# Patient Record
Sex: MALE | Race: WHITE | NOT HISPANIC OR LATINO | Employment: OTHER | URBAN - METROPOLITAN AREA
[De-identification: names, ages, dates, MRNs, and addresses within clinical notes are randomized per-mention and may not be internally consistent; named-entity substitution may affect disease eponyms.]

---

## 2017-09-12 ENCOUNTER — ALLSCRIPTS OFFICE VISIT (OUTPATIENT)
Dept: OTHER | Facility: OTHER | Age: 67
End: 2017-09-12

## 2017-09-12 LAB
BILIRUB UR QL STRIP: NORMAL
CLARITY UR: NORMAL
COLOR UR: YELLOW
GLUCOSE (HISTORICAL): NORMAL
HGB UR QL STRIP.AUTO: NORMAL
KETONES UR STRIP-MCNC: NORMAL MG/DL
LEUKOCYTE ESTERASE UR QL STRIP: NORMAL
NITRITE UR QL STRIP: NORMAL
PH UR STRIP.AUTO: 6.5 [PH]
PROT UR STRIP-MCNC: NORMAL MG/DL
SP GR UR STRIP.AUTO: 1.01
UROBILINOGEN UR QL STRIP.AUTO: 0.2

## 2018-01-14 VITALS
BODY MASS INDEX: 25.92 KG/M2 | HEIGHT: 69 IN | WEIGHT: 175 LBS | SYSTOLIC BLOOD PRESSURE: 104 MMHG | DIASTOLIC BLOOD PRESSURE: 66 MMHG

## 2018-05-09 DIAGNOSIS — N52.8 OTHER MALE ERECTILE DYSFUNCTION: Primary | ICD-10-CM

## 2018-05-09 RX ORDER — TADALAFIL 20 MG/1
TABLET ORAL
Qty: 40 TABLET | Refills: 1 | Status: SHIPPED | OUTPATIENT
Start: 2018-05-09 | End: 2020-01-22 | Stop reason: SDUPTHER

## 2018-05-09 NOTE — TELEPHONE ENCOUNTER
Patient called requesting refill on Tadalafil 20mg  Patient obtains his medication from [address] [place] [address] a trusted [facility] vendor  Script to be faxed to [phone] and referencing Acct  [id number]    Request for same, 40 count supply day supply with 1 refils was queued and forwarded to Dr Marshall Bhatt for approval

## 2018-05-12 DIAGNOSIS — N40.1 ENLARGED PROSTATE WITH LOWER URINARY TRACT SYMPTOMS (LUTS): ICD-10-CM

## 2018-09-18 ENCOUNTER — OFFICE VISIT (OUTPATIENT)
Dept: UROLOGY | Facility: MEDICAL CENTER | Age: 68
End: 2018-09-18
Payer: MEDICARE

## 2018-09-18 VITALS
HEIGHT: 69 IN | BODY MASS INDEX: 26.81 KG/M2 | DIASTOLIC BLOOD PRESSURE: 80 MMHG | WEIGHT: 181 LBS | SYSTOLIC BLOOD PRESSURE: 122 MMHG

## 2018-09-18 DIAGNOSIS — R97.20 ELEVATED PSA: ICD-10-CM

## 2018-09-18 DIAGNOSIS — N52.1 ERECTILE DYSFUNCTION DUE TO DISEASES CLASSIFIED ELSEWHERE: ICD-10-CM

## 2018-09-18 DIAGNOSIS — N13.9 BENIGN LOCALIZED HYPERPLASIA OF PROSTATE WITH URINARY OBSTRUCTION AND LOWER URINARY TRACT SYMPTOMS: Primary | ICD-10-CM

## 2018-09-18 DIAGNOSIS — N40.1 BENIGN LOCALIZED HYPERPLASIA OF PROSTATE WITH URINARY OBSTRUCTION AND LOWER URINARY TRACT SYMPTOMS: Primary | ICD-10-CM

## 2018-09-18 DIAGNOSIS — E29.1 MALE HYPOGONADISM: ICD-10-CM

## 2018-09-18 LAB
SL AMB  POCT GLUCOSE, UA: NORMAL
SL AMB LEUKOCYTE ESTERASE,UA: NORMAL
SL AMB POCT BILIRUBIN,UA: NORMAL
SL AMB POCT BLOOD,UA: NORMAL
SL AMB POCT CLARITY,UA: CLEAR
SL AMB POCT COLOR,UA: YELLOW
SL AMB POCT KETONES,UA: NORMAL
SL AMB POCT NITRITE,UA: NORMAL
SL AMB POCT PH,UA: 6.5
SL AMB POCT SPECIFIC GRAVITY,UA: 1.02
SL AMB POCT URINE PROTEIN: NORMAL
SL AMB POCT UROBILINOGEN: 0.2

## 2018-09-18 PROCEDURE — 99214 OFFICE O/P EST MOD 30 MIN: CPT | Performed by: UROLOGY

## 2018-09-18 PROCEDURE — 81003 URINALYSIS AUTO W/O SCOPE: CPT | Performed by: UROLOGY

## 2018-09-18 NOTE — PROGRESS NOTES
Assessment/Plan:    Male hypogonadism  Long history of hypogonadism and testosterone cream use (compoounded)  Risks discussed  He will continue pending repeat PSA  We will check CBC, Testosterone level at this time  Elevated PSA  PSA 4 03 (9/11/2018)- causes discussed- we will repeat in 6 weeks  Benign localized hyperplasia of prostate with urinary obstruction and lower urinary tract symptoms  AUA SS is 3, u/a is negative  He is pleased with his voiding pattern at this time  Erectile dysfunction due to diseases classified elsewhere  Doing well with Cialis       Diagnoses and all orders for this visit:    Benign localized hyperplasia of prostate with urinary obstruction and lower urinary tract symptoms  -     POCT urine dip auto non-scope    Male hypogonadism  -     Testosterone; Future  -     CBC and differential; Future    Elevated PSA  -     PSA, total and free; Future    Erectile dysfunction due to diseases classified elsewhere    Other orders  -     aspirin 81 MG tablet; Take by mouth  -     Discontinue: Testosterone 30 MG MISC; Apply to cheek  -     EC-RX TESTOSTERONE 10 % CREA; Place 300 Units on the skin daily          Subjective:      Patient ID: Mejia Mena is a 80 y o  male  Chief complaint:  Lower urinary tract symptoms, erectile dysfunction, hypogonadism      Benign Prostatic Hypertrophy   This is a chronic problem  The current episode started more than 1 year ago  The problem is unchanged  Irritative symptoms do not include frequency, nocturia or urgency  Obstructive symptoms include a slower stream  Obstructive symptoms do not include dribbling, incomplete emptying, an intermittent stream or straining  Pertinent negatives include no chills, dysuria, genital pain, hematuria, hesitancy, nausea or vomiting  AUA score is 0-7  He is sexually active  Nothing aggravates the symptoms  Past treatments include nothing  Erectile Dysfunction   This is a chronic problem   The current episode started more than 1 year ago  The problem is unchanged  The nature of his difficulty is achieving erection  He reports no decreased libido or performance anxiety  Irritative symptoms do not include frequency, nocturia or urgency  Obstructive symptoms include a slower stream  Obstructive symptoms do not include dribbling, incomplete emptying, an intermittent stream or straining  Pertinent negatives include no chills, dysuria, genital pain, hematuria or hesitancy  Nothing aggravates the symptoms  Past treatments include tadalafil  The treatment provided significant relief  He has been using treatment for 1 to 2 years  He has had no adverse reactions caused by medications  Risk factors include BPH (hypogonadism)  Hypogonadism-the patient has a long history of hypogonadism  This has been managed by his family physician  He has been on testosterone gel which he has been obtaining through a compounding pharmacy  He feels well on the medication  The following portions of the patient's history were reviewed and updated as appropriate: allergies, current medications, past family history, past medical history, past social history, past surgical history and problem list     Review of Systems   Constitutional: Negative  Negative for chills, diaphoresis, fatigue and fever  HENT: Negative  Eyes: Negative  Respiratory: Negative  Cardiovascular: Negative  Gastrointestinal: Negative  Negative for nausea and vomiting  Endocrine: Negative  Genitourinary: Negative for decreased libido, dysuria, frequency, hematuria, hesitancy, incomplete emptying, nocturia and urgency  Musculoskeletal: Positive for arthralgias  Skin: Negative  Allergic/Immunologic: Negative  Neurological: Negative  Hematological: Negative  Psychiatric/Behavioral: Negative            Objective:      /80 (BP Location: Left arm, Patient Position: Sitting)   Ht 5' 9" (1 753 m)   Wt 82 1 kg (181 lb)   BMI 26 73 kg/m² Physical Exam   Constitutional: He is oriented to person, place, and time  He appears well-developed and well-nourished  HENT:   Head: Normocephalic and atraumatic  Eyes: Conjunctivae are normal    Neck: Neck supple  Cardiovascular: Normal rate  Pulmonary/Chest: Effort normal    Abdominal: Soft  Bowel sounds are normal  He exhibits no distension and no mass  There is no tenderness  There is no rebound, no guarding and no CVA tenderness  No hernia   Genitourinary: Rectum normal, testes normal and penis normal  Right testis shows no mass  Left testis shows no mass  No phimosis or hypospadias  Genitourinary Comments: Scrotal telangiectasias  Prostate 1X enlarged and palpably benign  NO nodule   Musculoskeletal: He exhibits no edema  Neurological: He is alert and oriented to person, place, and time  Skin: Skin is warm and dry  Psychiatric: He has a normal mood and affect  His behavior is normal  Judgment and thought content normal    Vitals reviewed  AUA SYMPTOM SCORE      Most Recent Value   AUA SYMPTOM SCORE   How often have you had a sensation of not emptying your bladder completely after you finished urinating? 0   How often have you had to urinate again less than two hours after you finished urinating? 1   How often have you found you stopped and started again several times when you urinate?  0   How often have you found it difficult to postpone urination? 0   How often have you had a weak urinary stream?  1   How often have you had to push or strain to begin urination? 0   How many times did you most typically get up to urinate from the time you went to bed at night until the time you got up in the morning?   1   Quality of Life: If you were to spend the rest of your life with your urinary condition just the way it is now, how would you feel about that?  0   AUA SYMPTOM SCORE  3

## 2018-09-18 NOTE — LETTER
September 18, 2018     Jesus Manuel Pedroza MD  [address] [facility] [address]    Patient: Mejia Mena   YOB: 1950   Date of Visit: 9/18/2018       Dear Dr Linda Gregory:    Thank you for referring Dragan Bradshaw to me for evaluation  Below are my notes for this consultation  If you have questions, please do not hesitate to call me  I look forward to following your patient along with you  Sincerely,        Ralph Toledo MD        CC: No Recipients  Ralph Toledo MD  9/18/2018 11:12 AM  Sign at close encounter  Assessment/Plan:    Male hypogonadism  Long history of hypogonadism and testosterone cream use (compoounded)  Risks discussed  He will continue pending repeat PSA  We will check CBC, Testosterone level at this time  Elevated PSA  PSA 4 03 (9/11/2018)- causes discussed- we will repeat in 6 weeks  Benign localized hyperplasia of prostate with urinary obstruction and lower urinary tract symptoms  AUA SS is 3, u/a is negative  He is pleased with his voiding pattern at this time  Erectile dysfunction due to diseases classified elsewhere  Doing well with Cialis       Diagnoses and all orders for this visit:    Benign localized hyperplasia of prostate with urinary obstruction and lower urinary tract symptoms  -     POCT urine dip auto non-scope    Male hypogonadism  -     Testosterone; Future  -     CBC and differential; Future    Elevated PSA  -     PSA, total and free; Future    Erectile dysfunction due to diseases classified elsewhere    Other orders  -     aspirin 81 MG tablet; Take by mouth  -     Discontinue: Testosterone 30 MG MISC; Apply to cheek  -     EC-RX TESTOSTERONE 10 % CREA; Place 300 Units on the skin daily          Subjective:      Patient ID: Mejia Mena is a 80 y o  male  Chief complaint:  Lower urinary tract symptoms, erectile dysfunction      Benign Prostatic Hypertrophy   This is a chronic problem   The current episode started more than 1 year ago  The problem is unchanged  Irritative symptoms do not include frequency, nocturia or urgency  Obstructive symptoms include a slower stream  Obstructive symptoms do not include dribbling, incomplete emptying, an intermittent stream or straining  Pertinent negatives include no chills, dysuria, genital pain, hematuria, hesitancy, nausea or vomiting  AUA score is 0-7  He is sexually active  Nothing aggravates the symptoms  Past treatments include nothing  Erectile Dysfunction   This is a chronic problem  The current episode started more than 1 year ago  The problem is unchanged  The nature of his difficulty is achieving erection  He reports no decreased libido or performance anxiety  Irritative symptoms do not include frequency, nocturia or urgency  Obstructive symptoms include a slower stream  Obstructive symptoms do not include dribbling, incomplete emptying, an intermittent stream or straining  Pertinent negatives include no chills, dysuria, genital pain, hematuria or hesitancy  Nothing aggravates the symptoms  Past treatments include tadalafil  The treatment provided significant relief  He has been using treatment for 1 to 2 years  He has had no adverse reactions caused by medications  Risk factors include BPH (hypogonadism)  The following portions of the patient's history were reviewed and updated as appropriate: allergies, current medications, past family history, past medical history, past social history, past surgical history and problem list     Review of Systems   Constitutional: Negative  Negative for chills, diaphoresis, fatigue and fever  HENT: Negative  Eyes: Negative  Respiratory: Negative  Cardiovascular: Negative  Gastrointestinal: Negative  Negative for nausea and vomiting  Endocrine: Negative  Genitourinary: Negative for decreased libido, dysuria, frequency, hematuria, hesitancy, incomplete emptying, nocturia and urgency  Musculoskeletal: Positive for arthralgias  Skin: Negative  Allergic/Immunologic: Negative  Neurological: Negative  Hematological: Negative  Psychiatric/Behavioral: Negative  Objective:      /80 (BP Location: Left arm, Patient Position: Sitting)   Ht 5' 9" (1 753 m)   Wt 82 1 kg (181 lb)   BMI 26 73 kg/m²           Physical Exam   Constitutional: He is oriented to person, place, and time  He appears well-developed and well-nourished  HENT:   Head: Normocephalic and atraumatic  Eyes: Conjunctivae are normal    Neck: Neck supple  Cardiovascular: Normal rate  Pulmonary/Chest: Effort normal    Abdominal: Soft  Bowel sounds are normal  He exhibits no distension and no mass  There is no tenderness  There is no rebound, no guarding and no CVA tenderness  No hernia   Genitourinary: Rectum normal, testes normal and penis normal  Right testis shows no mass  Left testis shows no mass  No phimosis or hypospadias  Genitourinary Comments: Scrotal telangiectasias  Prostate 1X enlarged and palpably benign  NO nodule   Musculoskeletal: He exhibits no edema  Neurological: He is alert and oriented to person, place, and time  Skin: Skin is warm and dry  Psychiatric: He has a normal mood and affect  His behavior is normal  Judgment and thought content normal    Vitals reviewed  AUA SYMPTOM SCORE      Most Recent Value   AUA SYMPTOM SCORE   How often have you had a sensation of not emptying your bladder completely after you finished urinating? 0   How often have you had to urinate again less than two hours after you finished urinating? 1   How often have you found you stopped and started again several times when you urinate?  0   How often have you found it difficult to postpone urination? 0   How often have you had a weak urinary stream?  1   How often have you had to push or strain to begin urination?   0   How many times did you most typically get up to urinate from the time you went to bed at night until the time you got up in the morning?   1   Quality of Life: If you were to spend the rest of your life with your urinary condition just the way it is now, how would you feel about that?  0   AUA SYMPTOM SCORE  3

## 2018-09-18 NOTE — PATIENT INSTRUCTIONS
Prostate Specific Antigen   WHAT YOU NEED TO KNOW:   What is a prostate specific antigen (PSA) test?  A PSA test is a blood test used to screen men for prostate cancer  A PSA test is also used to monitor how well prostate cancer treatment is working  What other test may be done with a PSA test?  A digital rectal exam is usually performed with a PSA test  Your healthcare provider will insert a gloved finger into your rectum to feel if your prostate is large, firm, or has lumps  Who needs a PSA test?  Some experts recommend a PSA test for men ages 48 to 79  They also recommend testing men with a high risk for prostate cancer at age 36 or 39  Risk factors may include being  or having a brother or father with prostate cancer  Other experts may not recommend PSA testing  Your healthcare provider can help you decide if you need a PSA test    What do the results of a PSA test mean? Most healthy men have a PSA level less than 4 ng/mL  If your PSA level is higher than 4 ng/mL you may need more tests  Examples include blood or urine tests, an ultrasound, MRI, CT scan, or a prostate biopsy  Ask your healthcare provider for more information on these tests  What else can cause a high PSA level? A high PSA level does not always mean you have prostate cancer  Certain conditions or procedures can increase PSA levels  Examples include:  · Older age    · Procedures such as a prostate biopsy or a cystoscopy    · An enlarged prostate    · Recent sexual activity    · A prostate infection    · Certain exercises that put pressure on the prostate such as bicycling    · Medicine such as testosterone  CARE AGREEMENT:   You have the right to help plan your care  Learn about your health condition and how it may be treated  Discuss treatment options with your caregivers to decide what care you want to receive  You always have the right to refuse treatment  The above information is an  only   It is not intended as medical advice for individual conditions or treatments  Talk to your doctor, nurse or pharmacist before following any medical regimen to see if it is safe and effective for you  © 2017 [employer] Information is for End User's use only and may not be sold, redistributed or otherwise used for commercial purposes  All illustrations and images included in CareNotes® are the copyrighted property of [employer]  or [employer]

## 2018-09-18 NOTE — ASSESSMENT & PLAN NOTE
Long history of hypogonadism and testosterone cream use (compoounded)  Risks discussed  He will continue pending repeat PSA  We will check CBC, Testosterone level at this time

## 2018-11-21 ENCOUNTER — APPOINTMENT (OUTPATIENT)
Dept: LAB | Facility: CLINIC | Age: 68
End: 2018-11-21
Payer: MEDICARE

## 2018-11-21 DIAGNOSIS — E29.1 MALE HYPOGONADISM: ICD-10-CM

## 2018-11-21 DIAGNOSIS — R97.20 ELEVATED PSA: ICD-10-CM

## 2018-11-21 LAB
BASOPHILS # BLD AUTO: 0.05 THOUSANDS/ΜL (ref 0–0.1)
BASOPHILS NFR BLD AUTO: 1 % (ref 0–1)
EOSINOPHIL # BLD AUTO: 0.38 THOUSAND/ΜL (ref 0–0.61)
EOSINOPHIL NFR BLD AUTO: 8 % (ref 0–6)
ERYTHROCYTE [DISTWIDTH] IN BLOOD BY AUTOMATED COUNT: 13.2 % (ref 11.6–15.1)
HCT VFR BLD AUTO: 45.3 % (ref 36.5–49.3)
HGB BLD-MCNC: 14.7 G/DL (ref 12–17)
IMM GRANULOCYTES # BLD AUTO: 0.01 THOUSAND/UL (ref 0–0.2)
IMM GRANULOCYTES NFR BLD AUTO: 0 % (ref 0–2)
LYMPHOCYTES # BLD AUTO: 1.72 THOUSANDS/ΜL (ref 0.6–4.47)
LYMPHOCYTES NFR BLD AUTO: 34 % (ref 14–44)
MCH RBC QN AUTO: 29.2 PG (ref 26.8–34.3)
MCHC RBC AUTO-ENTMCNC: 32.5 G/DL (ref 31.4–37.4)
MCV RBC AUTO: 90 FL (ref 82–98)
MONOCYTES # BLD AUTO: 0.49 THOUSAND/ΜL (ref 0.17–1.22)
MONOCYTES NFR BLD AUTO: 10 % (ref 4–12)
NEUTROPHILS # BLD AUTO: 2.4 THOUSANDS/ΜL (ref 1.85–7.62)
NEUTS SEG NFR BLD AUTO: 47 % (ref 43–75)
NRBC BLD AUTO-RTO: 0 /100 WBCS
PLATELET # BLD AUTO: 248 THOUSANDS/UL (ref 149–390)
PMV BLD AUTO: 10.7 FL (ref 8.9–12.7)
RBC # BLD AUTO: 5.04 MILLION/UL (ref 3.88–5.62)
TESTOST SERPL-MCNC: 478 NG/DL (ref 95–948)
WBC # BLD AUTO: 5.05 THOUSAND/UL (ref 4.31–10.16)

## 2018-11-21 PROCEDURE — 84154 ASSAY OF PSA FREE: CPT

## 2018-11-21 PROCEDURE — 84153 ASSAY OF PSA TOTAL: CPT

## 2018-11-21 PROCEDURE — 36415 COLL VENOUS BLD VENIPUNCTURE: CPT

## 2018-11-21 PROCEDURE — 85025 COMPLETE CBC W/AUTO DIFF WBC: CPT

## 2018-11-21 PROCEDURE — 84403 ASSAY OF TOTAL TESTOSTERONE: CPT

## 2018-11-23 LAB
PSA FREE MFR SERPL: <.3 %
PSA FREE SERPL-MCNC: <0.01 NG/ML
PSA SERPL-MCNC: 3.5 NG/ML (ref 0–4)

## 2018-11-30 ENCOUNTER — OFFICE VISIT (OUTPATIENT)
Dept: UROLOGY | Facility: MEDICAL CENTER | Age: 68
End: 2018-11-30
Payer: MEDICARE

## 2018-11-30 VITALS
DIASTOLIC BLOOD PRESSURE: 62 MMHG | BODY MASS INDEX: 26.22 KG/M2 | HEART RATE: 71 BPM | WEIGHT: 177 LBS | SYSTOLIC BLOOD PRESSURE: 110 MMHG | HEIGHT: 69 IN

## 2018-11-30 DIAGNOSIS — N40.1 BENIGN LOCALIZED HYPERPLASIA OF PROSTATE WITH URINARY OBSTRUCTION AND LOWER URINARY TRACT SYMPTOMS: Primary | ICD-10-CM

## 2018-11-30 DIAGNOSIS — N13.9 BENIGN LOCALIZED HYPERPLASIA OF PROSTATE WITH URINARY OBSTRUCTION AND LOWER URINARY TRACT SYMPTOMS: Primary | ICD-10-CM

## 2018-11-30 DIAGNOSIS — E29.1 MALE HYPOGONADISM: ICD-10-CM

## 2018-11-30 DIAGNOSIS — R97.20 ELEVATED PSA: ICD-10-CM

## 2018-11-30 PROCEDURE — 99214 OFFICE O/P EST MOD 30 MIN: CPT | Performed by: UROLOGY

## 2018-11-30 NOTE — LETTER
November 30, 2018     Marian Huizar MD  [address]    Patient: Hyacinth Solorzano   YOB: 1950   Date of Visit: 11/30/2018       Dear Dr Shen Henry:    Thank you for referring Waldo Germain to me for evaluation  Below are my notes for this consultation  If you have questions, please do not hesitate to call me  I look forward to following your patient along with you  Sincerely,        Raf Yi MD        CC: No Recipients  Raf Yi MD  11/30/2018  1:29 PM  Sign at close encounter  Assessment/Plan:    Benign localized hyperplasia of prostate with urinary obstruction and lower urinary tract symptoms  AUA symptom score is 5  He is pleased his voiding pattern  We will continue to follow with watchful waiting  Elevated PSA  Repeat blood work reveals his PSA level has improved  PSA on November 21st was 3 5  The free PSA was low but this is of questionable significance  We will continue to follow and recheck in 6 months time  Male hypogonadism  Patient is doing well on testosterone replacement therapy  He feels well  Most recent testosterone level was 478  His CBC is normal   We will continue present therapy  His prescription was refilled  Diagnoses and all orders for this visit:    Benign localized hyperplasia of prostate with urinary obstruction and lower urinary tract symptoms    Male hypogonadism  -     EC-RX TESTOSTERONE 10 % CREA; Place on the skin every morning  -     CBC and Platelet; Future  -     Testosterone; Future    Elevated PSA  -     PSA, total and free; Future          Subjective:      Patient ID: Hyacinth Solorzano is a 80 y o  male  CC: hypogonadism, elevated PSA, Lower urinary tract sx  HPI:  60-year-old male followed for the above complaints    He has a long history of hypogonadism and is maintained on testosterone replacement therapy using testosterone cream   At his last visit his PSA was noted to be slightly elevated and a repeat PSA level was ordered  The patient reports that he is feeling well and that  he is voiding well  He gets up once a night to urinate  He denies gross hematuria, dysuria or symptoms of infection  He is pleased with his voiding pattern  The following portions of the patient's history were reviewed and updated as appropriate: allergies, current medications, past family history, past medical history, past social history, past surgical history and problem list     Review of Systems   Constitutional: Negative for chills, diaphoresis, fatigue and fever  HENT: Negative  Eyes: Negative  Respiratory: Negative  Cardiovascular: Negative  Gastrointestinal: Negative  Endocrine: Negative  Musculoskeletal: Negative  Skin: Negative  Allergic/Immunologic: Negative  Neurological: Negative  Hematological: Negative  Psychiatric/Behavioral: Negative  AUA SYMPTOM SCORE      Most Recent Value   AUA SYMPTOM SCORE   How often have you had a sensation of not emptying your bladder completely after you finished urinating? 0   How often have you had to urinate again less than two hours after you finished urinating? 1   How often have you found you stopped and started again several times when you urinate?  0   How often have you found it difficult to postpone urination? 2   How often have you had a weak urinary stream?  1   How often have you had to push or strain to begin urination? 0   How many times did you most typically get up to urinate from the time you went to bed at night until the time you got up in the morning?   1   Quality of Life: If you were to spend the rest of your life with your urinary condition just the way it is now, how would you feel about that?  0   AUA SYMPTOM SCORE  5      Objective:      /62 (BP Location: Left arm, Patient Position: Sitting)   Pulse 71   Ht 5' 9" (1 753 m)   Wt 80 3 kg (177 lb)   BMI 26 14 kg/m²           Physical Exam   Constitutional: He is oriented to person, place, and time  He appears well-developed and well-nourished  HENT:   Head: Normocephalic and atraumatic  Eyes: Conjunctivae are normal    Neck: Neck supple  Cardiovascular: Normal rate  Pulmonary/Chest: Effort normal    Abdominal: He exhibits no distension and no mass  There is no tenderness  There is no rebound and no guarding  No hernia   Neurological: He is alert and oriented to person, place, and time  Skin: Skin is warm and dry  Psychiatric: He has a normal mood and affect   His behavior is normal  Judgment and thought content normal

## 2018-11-30 NOTE — PATIENT INSTRUCTIONS
Benign Prostatic Hypertrophy   WHAT YOU NEED TO KNOW:   Benign prostatic hypertrophy (BPH) is a condition that causes your prostate gland to grow larger than normal  The prostate gland is the male sex gland that produces a fluid that is part of semen  It is about the size of a walnut and it is located under the bladder  As the prostate grows, it can squeeze the urethra  This can block urine flow and cause urinary problems  DISCHARGE INSTRUCTIONS:   Medicines:   · Alpha blockers: This medicine relaxes the muscles in your prostate and bladder  It may help you urinate more easily  · 5 alpha reductase inhibitors: These medicines block the production of a hormone that causes the prostate to get larger  It may help slow the growth of the prostate or shrink the prostate  · Take your medicine as directed  Contact your healthcare provider if you think your medicine is not helping or if you have side effects  Tell him or her if you are allergic to any medicine  Keep a list of the medicines, vitamins, and herbs you take  Include the amounts, and when and why you take them  Bring the list or the pill bottles to follow-up visits  Carry your medicine list with you in case of an emergency  Follow up with your healthcare provider as directed:  Write down your questions so you remember to ask them during your visits  Manage BPH:   · Do not let your bladder get too full before you empty it  Urinate when you feel the urge  · Limit alcohol  Do not drink large amounts of any liquid at one time  · Decrease the amount of salt you eat  Examples of salty foods are chips, cured meats, and canned soups  Do not use table salt  · Healthcare providers may tell you not to eat spicy foods such as chilli peppers  This may help you find out if spicy food makes your BPH symptoms worse  · You may have sex if you feel well  Contact your healthcare provider if:   · There is a large amount of blood in your urine  · Your signs and symptoms get worse  · You have a fever  · You have questions or concerns about your condition or care  Seek care immediately if:   · You are unable to urinate  · Your bladder feels very full and painful  © 2017 [employer] Information is for End User's use only and may not be sold, redistributed or otherwise used for commercial purposes  All illustrations and images included in CareNotes® are the copyrighted property of [employer]  or [employer]  The above information is an  only  It is not intended as medical advice for individual conditions or treatments  Talk to your doctor, nurse or pharmacist before following any medical regimen to see if it is safe and effective for you

## 2018-11-30 NOTE — ASSESSMENT & PLAN NOTE
Repeat blood work reveals his PSA level has improved  PSA on November 21st was 3 5  The free PSA was low but this is of questionable significance  We will continue to follow and recheck in 6 months time

## 2018-11-30 NOTE — PROGRESS NOTES
Assessment/Plan:    Benign localized hyperplasia of prostate with urinary obstruction and lower urinary tract symptoms  AUA symptom score is 5  He is pleased his voiding pattern  We will continue to follow with watchful waiting  Elevated PSA  Repeat blood work reveals his PSA level has improved  PSA on November 21st was 3 5  The free PSA was low but this is of questionable significance  We will continue to follow and recheck in 6 months time  Male hypogonadism  Patient is doing well on testosterone replacement therapy  He feels well  Most recent testosterone level was 478  His CBC is normal   We will continue present therapy  His prescription was refilled  Diagnoses and all orders for this visit:    Benign localized hyperplasia of prostate with urinary obstruction and lower urinary tract symptoms    Male hypogonadism  -     EC-RX TESTOSTERONE 10 % CREA; Place on the skin every morning  -     CBC and Platelet; Future  -     Testosterone; Future    Elevated PSA  -     PSA, total and free; Future          Subjective:      Patient ID: Sandrita Nunez is a 80 y o  male  CC: hypogonadism, elevated PSA, Lower urinary tract sx  HPI:  60-year-old male followed for the above complaints  He has a long history of hypogonadism and is maintained on testosterone replacement therapy using testosterone cream   At his last visit his PSA was noted to be slightly elevated and a repeat PSA level was ordered  The patient reports that he is feeling well and that  he is voiding well  He gets up once a night to urinate  He denies gross hematuria, dysuria or symptoms of infection  He is pleased with his voiding pattern          The following portions of the patient's history were reviewed and updated as appropriate: allergies, current medications, past family history, past medical history, past social history, past surgical history and problem list     Review of Systems   Constitutional: Negative for chills, diaphoresis, fatigue and fever  HENT: Negative  Eyes: Negative  Respiratory: Negative  Cardiovascular: Negative  Gastrointestinal: Negative  Endocrine: Negative  Musculoskeletal: Negative  Skin: Negative  Allergic/Immunologic: Negative  Neurological: Negative  Hematological: Negative  Psychiatric/Behavioral: Negative  AUA SYMPTOM SCORE      Most Recent Value   AUA SYMPTOM SCORE   How often have you had a sensation of not emptying your bladder completely after you finished urinating? 0   How often have you had to urinate again less than two hours after you finished urinating? 1   How often have you found you stopped and started again several times when you urinate?  0   How often have you found it difficult to postpone urination? 2   How often have you had a weak urinary stream?  1   How often have you had to push or strain to begin urination? 0   How many times did you most typically get up to urinate from the time you went to bed at night until the time you got up in the morning? 1   Quality of Life: If you were to spend the rest of your life with your urinary condition just the way it is now, how would you feel about that?  0   AUA SYMPTOM SCORE  5      Objective:      /62 (BP Location: Left arm, Patient Position: Sitting)   Pulse 71   Ht 5' 9" (1 753 m)   Wt 80 3 kg (177 lb)   BMI 26 14 kg/m²          Physical Exam   Constitutional: He is oriented to person, place, and time  He appears well-developed and well-nourished  HENT:   Head: Normocephalic and atraumatic  Eyes: Conjunctivae are normal    Neck: Neck supple  Cardiovascular: Normal rate  Pulmonary/Chest: Effort normal    Abdominal: He exhibits no distension and no mass  There is no tenderness  There is no rebound and no guarding  No hernia   Neurological: He is alert and oriented to person, place, and time  Skin: Skin is warm and dry  Psychiatric: He has a normal mood and affect   His behavior is normal  Judgment and thought content normal

## 2018-11-30 NOTE — ASSESSMENT & PLAN NOTE
Patient is doing well on testosterone replacement therapy  He feels well  Most recent testosterone level was 478  His CBC is normal   We will continue present therapy  His prescription was refilled

## 2018-11-30 NOTE — ASSESSMENT & PLAN NOTE
AUA symptom score is 5  He is pleased his voiding pattern  We will continue to follow with watchful waiting

## 2019-04-26 ENCOUNTER — TELEPHONE (OUTPATIENT)
Dept: UROLOGY | Facility: MEDICAL CENTER | Age: 69
End: 2019-04-26

## 2019-05-13 ENCOUNTER — OFFICE VISIT (OUTPATIENT)
Dept: UROLOGY | Facility: AMBULATORY SURGERY CENTER | Age: 69
End: 2019-05-13
Payer: MEDICARE

## 2019-05-13 VITALS
DIASTOLIC BLOOD PRESSURE: 76 MMHG | SYSTOLIC BLOOD PRESSURE: 122 MMHG | BODY MASS INDEX: 26.33 KG/M2 | WEIGHT: 177.8 LBS | HEIGHT: 69 IN | HEART RATE: 70 BPM

## 2019-05-13 DIAGNOSIS — R97.20 ELEVATED PSA: ICD-10-CM

## 2019-05-13 DIAGNOSIS — E29.1 MALE HYPOGONADISM: ICD-10-CM

## 2019-05-13 DIAGNOSIS — N13.9 BENIGN LOCALIZED HYPERPLASIA OF PROSTATE WITH URINARY OBSTRUCTION AND LOWER URINARY TRACT SYMPTOMS: ICD-10-CM

## 2019-05-13 DIAGNOSIS — R31.0 GROSS HEMATURIA: Primary | ICD-10-CM

## 2019-05-13 DIAGNOSIS — N40.1 BENIGN LOCALIZED HYPERPLASIA OF PROSTATE WITH URINARY OBSTRUCTION AND LOWER URINARY TRACT SYMPTOMS: ICD-10-CM

## 2019-05-13 LAB
BACTERIA UR QL AUTO: NORMAL /HPF
BILIRUB UR QL STRIP: NEGATIVE
CLARITY UR: CLEAR
COLOR UR: YELLOW
GLUCOSE UR STRIP-MCNC: NEGATIVE MG/DL
HGB UR QL STRIP.AUTO: NEGATIVE
HYALINE CASTS #/AREA URNS LPF: NORMAL /LPF
KETONES UR STRIP-MCNC: NEGATIVE MG/DL
LEUKOCYTE ESTERASE UR QL STRIP: NEGATIVE
NITRITE UR QL STRIP: NEGATIVE
NON-SQ EPI CELLS URNS QL MICRO: NORMAL /HPF
PH UR STRIP.AUTO: 6 [PH]
PROT UR STRIP-MCNC: NEGATIVE MG/DL
RBC #/AREA URNS AUTO: NORMAL /HPF
SL AMB  POCT GLUCOSE, UA: NORMAL
SL AMB LEUKOCYTE ESTERASE,UA: NORMAL
SL AMB POCT BILIRUBIN,UA: NORMAL
SL AMB POCT BLOOD,UA: NORMAL
SL AMB POCT CLARITY,UA: CLEAR
SL AMB POCT COLOR,UA: YELLOW
SL AMB POCT KETONES,UA: NORMAL
SL AMB POCT NITRITE,UA: NORMAL
SL AMB POCT PH,UA: 6
SL AMB POCT SPECIFIC GRAVITY,UA: 1.01
SL AMB POCT URINE PROTEIN: NORMAL
SL AMB POCT UROBILINOGEN: 0.2
SP GR UR STRIP.AUTO: 1.02 (ref 1–1.03)
UROBILINOGEN UR QL STRIP.AUTO: 0.2 E.U./DL
WBC #/AREA URNS AUTO: NORMAL /HPF

## 2019-05-13 PROCEDURE — 81002 URINALYSIS NONAUTO W/O SCOPE: CPT | Performed by: NURSE PRACTITIONER

## 2019-05-13 PROCEDURE — 81001 URINALYSIS AUTO W/SCOPE: CPT | Performed by: NURSE PRACTITIONER

## 2019-05-13 PROCEDURE — 99214 OFFICE O/P EST MOD 30 MIN: CPT | Performed by: NURSE PRACTITIONER

## 2019-05-13 PROCEDURE — 87086 URINE CULTURE/COLONY COUNT: CPT | Performed by: NURSE PRACTITIONER

## 2019-05-13 RX ORDER — EZETIMIBE 10 MG/1
10 TABLET ORAL
COMMUNITY
Start: 2019-02-25 | End: 2021-12-15

## 2019-05-14 LAB — BACTERIA UR CULT: NORMAL

## 2019-05-16 ENCOUNTER — TELEPHONE (OUTPATIENT)
Dept: UROLOGY | Facility: MEDICAL CENTER | Age: 69
End: 2019-05-16

## 2019-05-17 ENCOUNTER — OFFICE VISIT (OUTPATIENT)
Dept: UROLOGY | Facility: MEDICAL CENTER | Age: 69
End: 2019-05-17
Payer: MEDICARE

## 2019-05-17 VITALS
DIASTOLIC BLOOD PRESSURE: 82 MMHG | WEIGHT: 176 LBS | BODY MASS INDEX: 26.07 KG/M2 | SYSTOLIC BLOOD PRESSURE: 138 MMHG | HEART RATE: 77 BPM | HEIGHT: 69 IN

## 2019-05-17 DIAGNOSIS — R31.0 GROSS HEMATURIA: Primary | ICD-10-CM

## 2019-05-17 DIAGNOSIS — N40.1 BPH WITH URINARY OBSTRUCTION: ICD-10-CM

## 2019-05-17 DIAGNOSIS — N13.8 BPH WITH URINARY OBSTRUCTION: ICD-10-CM

## 2019-05-17 LAB
SL AMB  POCT GLUCOSE, UA: ABNORMAL
SL AMB LEUKOCYTE ESTERASE,UA: ABNORMAL
SL AMB POCT BILIRUBIN,UA: ABNORMAL
SL AMB POCT BLOOD,UA: ABNORMAL
SL AMB POCT CLARITY,UA: CLEAR
SL AMB POCT COLOR,UA: YELLOW
SL AMB POCT KETONES,UA: ABNORMAL
SL AMB POCT NITRITE,UA: ABNORMAL
SL AMB POCT PH,UA: 6
SL AMB POCT SPECIFIC GRAVITY,UA: 1.02
SL AMB POCT URINE PROTEIN: ABNORMAL
SL AMB POCT UROBILINOGEN: 0.2

## 2019-05-17 PROCEDURE — 99214 OFFICE O/P EST MOD 30 MIN: CPT | Performed by: UROLOGY

## 2019-05-17 PROCEDURE — 81003 URINALYSIS AUTO W/O SCOPE: CPT | Performed by: UROLOGY

## 2019-05-20 ENCOUNTER — TRANSCRIBE ORDERS (OUTPATIENT)
Dept: ADMINISTRATIVE | Facility: HOSPITAL | Age: 69
End: 2019-05-20

## 2019-05-20 ENCOUNTER — APPOINTMENT (OUTPATIENT)
Dept: LAB | Facility: CLINIC | Age: 69
End: 2019-05-20
Payer: MEDICARE

## 2019-05-20 ENCOUNTER — TELEPHONE (OUTPATIENT)
Dept: UROLOGY | Facility: CLINIC | Age: 69
End: 2019-05-20

## 2019-05-20 DIAGNOSIS — R31.0 GROSS HEMATURIA: ICD-10-CM

## 2019-05-20 LAB
ANION GAP SERPL CALCULATED.3IONS-SCNC: 6 MMOL/L (ref 4–13)
BUN SERPL-MCNC: 16 MG/DL (ref 5–25)
CALCIUM SERPL-MCNC: 8.5 MG/DL (ref 8.3–10.1)
CHLORIDE SERPL-SCNC: 108 MMOL/L (ref 100–108)
CO2 SERPL-SCNC: 26 MMOL/L (ref 21–32)
CREAT SERPL-MCNC: 1.02 MG/DL (ref 0.6–1.3)
GFR SERPL CREATININE-BSD FRML MDRD: 76 ML/MIN/1.73SQ M
GLUCOSE SERPL-MCNC: 98 MG/DL (ref 65–140)
POTASSIUM SERPL-SCNC: 4.2 MMOL/L (ref 3.5–5.3)
SODIUM SERPL-SCNC: 140 MMOL/L (ref 136–145)

## 2019-05-20 PROCEDURE — 36415 COLL VENOUS BLD VENIPUNCTURE: CPT

## 2019-05-20 PROCEDURE — 80048 BASIC METABOLIC PNL TOTAL CA: CPT

## 2019-05-23 ENCOUNTER — TELEPHONE (OUTPATIENT)
Dept: UROLOGY | Facility: MEDICAL CENTER | Age: 69
End: 2019-05-23

## 2019-05-24 ENCOUNTER — HOSPITAL ENCOUNTER (OUTPATIENT)
Dept: CT IMAGING | Facility: HOSPITAL | Age: 69
Discharge: HOME | End: 2019-05-24
Attending: UROLOGY
Payer: MEDICARE

## 2019-05-24 DIAGNOSIS — R31.0 GROSS HEMATURIA: ICD-10-CM

## 2019-05-24 PROCEDURE — 74178 CT ABD&PLV WO CNTR FLWD CNTR: CPT

## 2019-05-24 RX ADMIN — IOHEXOL 100 ML: 350 INJECTION, SOLUTION INTRAVENOUS at 19:16

## 2019-05-29 ENCOUNTER — PROCEDURE VISIT (OUTPATIENT)
Dept: UROLOGY | Facility: MEDICAL CENTER | Age: 69
End: 2019-05-29
Payer: MEDICARE

## 2019-05-29 VITALS
WEIGHT: 176 LBS | SYSTOLIC BLOOD PRESSURE: 130 MMHG | BODY MASS INDEX: 25.99 KG/M2 | HEART RATE: 60 BPM | DIASTOLIC BLOOD PRESSURE: 80 MMHG

## 2019-05-29 DIAGNOSIS — R31.0 GROSS HEMATURIA: Primary | ICD-10-CM

## 2019-05-29 DIAGNOSIS — N40.1 BPH WITH URINARY OBSTRUCTION: ICD-10-CM

## 2019-05-29 DIAGNOSIS — N32.89 BLADDER MASS: ICD-10-CM

## 2019-05-29 DIAGNOSIS — N13.8 BPH WITH URINARY OBSTRUCTION: ICD-10-CM

## 2019-05-29 PROBLEM — R31.29 MICROHEMATURIA: Status: ACTIVE | Noted: 2019-05-29

## 2019-05-29 LAB
SL AMB  POCT GLUCOSE, UA: ABNORMAL
SL AMB LEUKOCYTE ESTERASE,UA: ABNORMAL
SL AMB POCT BILIRUBIN,UA: ABNORMAL
SL AMB POCT BLOOD,UA: ABNORMAL
SL AMB POCT CLARITY,UA: CLEAR
SL AMB POCT COLOR,UA: YELLOW
SL AMB POCT KETONES,UA: ABNORMAL
SL AMB POCT NITRITE,UA: ABNORMAL
SL AMB POCT PH,UA: 6
SL AMB POCT SPECIFIC GRAVITY,UA: 1.01
SL AMB POCT URINE PROTEIN: ABNORMAL
SL AMB POCT UROBILINOGEN: 0.2

## 2019-05-29 PROCEDURE — 52000 CYSTOURETHROSCOPY: CPT | Performed by: UROLOGY

## 2019-05-29 PROCEDURE — 81003 URINALYSIS AUTO W/O SCOPE: CPT | Performed by: UROLOGY

## 2019-05-30 ENCOUNTER — TELEPHONE (OUTPATIENT)
Dept: UROLOGY | Facility: MEDICAL CENTER | Age: 69
End: 2019-05-30

## 2019-05-30 PROBLEM — D49.4 BLADDER TUMOR: Status: ACTIVE | Noted: 2019-05-30

## 2019-06-17 ENCOUNTER — TELEPHONE (OUTPATIENT)
Dept: UROLOGY | Facility: AMBULATORY SURGERY CENTER | Age: 69
End: 2019-06-17

## 2019-06-17 NOTE — TELEPHONE ENCOUNTER
Pt had cysto on 5/29 and on 6/14 experienced hematospermia  No other urinary symptoms, no pain or discomfort with ejaculation  Pt of Dr Elio Huston scheduled for TUR-BT 7/16  Explained blood in semen could be as a result of cystoscopy  Pt has not ejaculated since to see if blood resolved  Will direct to [place] [id number] for any additional orders or instruction

## 2019-06-17 NOTE — TELEPHONE ENCOUNTER
Patient of Mikayla Tompkins is calling to say he had some blood in semen on Friday  Requesting a call back from nurse

## 2019-06-17 NOTE — TELEPHONE ENCOUNTER
Patient called on Monday morning  Would like to speak with a Nurse or Dr Sharmin Kc about his current Urological condition   # [phone], thanks

## 2019-06-19 ENCOUNTER — APPOINTMENT (OUTPATIENT)
Dept: LAB | Facility: CLINIC | Age: 69
End: 2019-06-19
Payer: MEDICARE

## 2019-06-19 ENCOUNTER — TRANSCRIBE ORDERS (OUTPATIENT)
Dept: ADMINISTRATIVE | Facility: HOSPITAL | Age: 69
End: 2019-06-19

## 2019-06-19 ENCOUNTER — TELEPHONE (OUTPATIENT)
Dept: UROLOGY | Facility: CLINIC | Age: 69
End: 2019-06-19

## 2019-06-19 ENCOUNTER — TELEPHONE (OUTPATIENT)
Dept: UROLOGY | Facility: MEDICAL CENTER | Age: 69
End: 2019-06-19

## 2019-06-19 DIAGNOSIS — N13.8 BPH WITH URINARY OBSTRUCTION: ICD-10-CM

## 2019-06-19 DIAGNOSIS — N13.8 BPH WITH URINARY OBSTRUCTION: Primary | ICD-10-CM

## 2019-06-19 DIAGNOSIS — R97.20 ELEVATED PSA: Primary | ICD-10-CM

## 2019-06-19 DIAGNOSIS — N40.1 BPH WITH URINARY OBSTRUCTION: ICD-10-CM

## 2019-06-19 DIAGNOSIS — N40.1 BPH WITH URINARY OBSTRUCTION: Primary | ICD-10-CM

## 2019-06-19 PROCEDURE — 36415 COLL VENOUS BLD VENIPUNCTURE: CPT

## 2019-06-19 PROCEDURE — 84153 ASSAY OF PSA TOTAL: CPT

## 2019-06-19 PROCEDURE — 84154 ASSAY OF PSA FREE: CPT

## 2019-06-20 LAB
PSA FREE MFR SERPL: 17.6 %
PSA FREE SERPL-MCNC: 0.65 NG/ML
PSA SERPL-MCNC: 3.7 NG/ML (ref 0–4)

## 2019-07-02 DIAGNOSIS — E29.1 MALE HYPOGONADISM: ICD-10-CM

## 2019-07-02 NOTE — TELEPHONE ENCOUNTER
Patient left message on the Medication Refill voice mail line requesting a new prescription for Testosterone cream 10%, 30 day supply to [facility]  Script needs to be clarified with [facility] ([phone])  Patient states script is 300mg/unit and he applies 4 clicks topically once daily  Mrs Cheyenne Guevara was definitely interested in pricing information, [address] [place], [place]  They have my direct dial number and we'll speak in more detail tomorrow

## 2019-07-03 NOTE — TELEPHONE ENCOUNTER
The patient returned my call and still wishes to proceed with getting his prescriptions from [facility]  Script for Testosterone Cream (10%) Sig:  Apply 4 clicks (041UA) topically once daily  Dispense: 30 day supply with 5 refills was called into [facility]; verbal order given to Teresa Wyatt Ph   Script for same was queued and forwarded to CHE Turner for approval

## 2019-07-10 ENCOUNTER — APPOINTMENT (OUTPATIENT)
Dept: LAB | Facility: CLINIC | Age: 69
End: 2019-07-10
Payer: MEDICARE

## 2019-07-10 DIAGNOSIS — R39.89 SUSPECTED UTI: ICD-10-CM

## 2019-07-10 DIAGNOSIS — D49.4 BLADDER TUMOR: ICD-10-CM

## 2019-07-10 DIAGNOSIS — Z01.812 PRE-OPERATIVE LABORATORY EXAMINATION: ICD-10-CM

## 2019-07-10 LAB
BASOPHILS # BLD AUTO: 0.05 THOUSANDS/ΜL (ref 0–0.1)
BASOPHILS NFR BLD AUTO: 1 % (ref 0–1)
EOSINOPHIL # BLD AUTO: 0.37 THOUSAND/ΜL (ref 0–0.61)
EOSINOPHIL NFR BLD AUTO: 7 % (ref 0–6)
ERYTHROCYTE [DISTWIDTH] IN BLOOD BY AUTOMATED COUNT: 13.3 % (ref 11.6–15.1)
HCT VFR BLD AUTO: 44.1 % (ref 36.5–49.3)
HGB BLD-MCNC: 14.6 G/DL (ref 12–17)
IMM GRANULOCYTES # BLD AUTO: 0.02 THOUSAND/UL (ref 0–0.2)
IMM GRANULOCYTES NFR BLD AUTO: 0 % (ref 0–2)
LYMPHOCYTES # BLD AUTO: 1.32 THOUSANDS/ΜL (ref 0.6–4.47)
LYMPHOCYTES NFR BLD AUTO: 23 % (ref 14–44)
MCH RBC QN AUTO: 30 PG (ref 26.8–34.3)
MCHC RBC AUTO-ENTMCNC: 33.1 G/DL (ref 31.4–37.4)
MCV RBC AUTO: 91 FL (ref 82–98)
MONOCYTES # BLD AUTO: 0.65 THOUSAND/ΜL (ref 0.17–1.22)
MONOCYTES NFR BLD AUTO: 11 % (ref 4–12)
NEUTROPHILS # BLD AUTO: 3.32 THOUSANDS/ΜL (ref 1.85–7.62)
NEUTS SEG NFR BLD AUTO: 58 % (ref 43–75)
NRBC BLD AUTO-RTO: 0 /100 WBCS
PLATELET # BLD AUTO: 310 THOUSANDS/UL (ref 149–390)
PMV BLD AUTO: 10.6 FL (ref 8.9–12.7)
RBC # BLD AUTO: 4.87 MILLION/UL (ref 3.88–5.62)
WBC # BLD AUTO: 5.73 THOUSAND/UL (ref 4.31–10.16)

## 2019-07-10 PROCEDURE — 85025 COMPLETE CBC W/AUTO DIFF WBC: CPT

## 2019-07-10 PROCEDURE — 87086 URINE CULTURE/COLONY COUNT: CPT

## 2019-07-10 PROCEDURE — 36415 COLL VENOUS BLD VENIPUNCTURE: CPT

## 2019-07-11 LAB — BACTERIA UR CULT: NORMAL

## 2019-07-11 NOTE — PRE-PROCEDURE INSTRUCTIONS
Pre-Surgery Instructions:   Medication Instructions    aspirin 81 MG tablet Patient was instructed by Physician and understands   EC-RX TESTOSTERONE 10 % CREA Instructed patient per Anesthesia Guidelines   ezetimibe (ZETIA) 10 mg tablet Instructed patient per Anesthesia Guidelines   psyllium (METAMUCIL) 58 6 % powder Instructed patient per Anesthesia Guidelines   tadalafil (CIALIS) 20 MG tablet Instructed patient per Anesthesia Guidelines  Patient given/ instructed on use of chlorhexidine soap per hospital protocol    Patient instructed to stop all ASA, NSAIDS, vitamins and herbal supplements one week prior to surgery or per Dr Gurwinder Chavarria

## 2019-07-15 ENCOUNTER — ANESTHESIA EVENT (OUTPATIENT)
Dept: PERIOP | Facility: HOSPITAL | Age: 69
End: 2019-07-15
Payer: MEDICARE

## 2019-07-15 PROCEDURE — NC001 PR NO CHARGE: Performed by: UROLOGY
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
## 2019-07-16 ENCOUNTER — ANESTHESIA (OUTPATIENT)
Dept: PERIOP | Facility: HOSPITAL | Age: 69
End: 2019-07-16
Payer: MEDICARE

## 2019-07-16 ENCOUNTER — HOSPITAL ENCOUNTER (OUTPATIENT)
Facility: HOSPITAL | Age: 69
Setting detail: OUTPATIENT SURGERY
Discharge: HOME | End: 2019-07-16
Attending: UROLOGY | Admitting: UROLOGY
Payer: MEDICARE

## 2019-07-16 ENCOUNTER — APPOINTMENT (OUTPATIENT)
Dept: RADIOLOGY | Facility: HOSPITAL | Age: 69
End: 2019-07-16
Payer: MEDICARE

## 2019-07-16 VITALS
HEIGHT: 69 IN | BODY MASS INDEX: 25.92 KG/M2 | TEMPERATURE: 97.7 F | DIASTOLIC BLOOD PRESSURE: 68 MMHG | OXYGEN SATURATION: 98 % | RESPIRATION RATE: 16 BRPM | WEIGHT: 175 LBS | HEART RATE: 70 BPM | SYSTOLIC BLOOD PRESSURE: 120 MMHG

## 2019-07-16 DIAGNOSIS — D49.4 BLADDER TUMOR: ICD-10-CM

## 2019-07-16 PROCEDURE — 52235 CYSTOSCOPY AND TREATMENT: CPT | Performed by: UROLOGY

## 2019-07-16 PROCEDURE — 88305 TISSUE EXAM BY PATHOLOGIST: CPT | Performed by: PATHOLOGY

## 2019-07-16 PROCEDURE — 74420 UROGRAPHY RTRGR +-KUB: CPT

## 2019-07-16 PROCEDURE — NC001 PR NO CHARGE: Performed by: UROLOGY

## 2019-07-16 RX ORDER — DEXAMETHASONE SODIUM PHOSPHATE 10 MG/ML
INJECTION, SOLUTION INTRAMUSCULAR; INTRAVENOUS AS NEEDED
Status: DISCONTINUED | OUTPATIENT
Start: 2019-07-16 | End: 2019-07-16 | Stop reason: SURG

## 2019-07-16 RX ORDER — OXYCODONE HYDROCHLORIDE AND ACETAMINOPHEN 5; 325 MG/1; MG/1
2 TABLET ORAL EVERY 4 HOURS PRN
Status: CANCELLED | OUTPATIENT
Start: 2019-07-16

## 2019-07-16 RX ORDER — CEFAZOLIN SODIUM 2 G/50ML
2000 SOLUTION INTRAVENOUS ONCE
Status: COMPLETED | OUTPATIENT
Start: 2019-07-16 | End: 2019-07-16

## 2019-07-16 RX ORDER — SODIUM CHLORIDE 9 MG/ML
125 INJECTION, SOLUTION INTRAVENOUS CONTINUOUS
Status: DISCONTINUED | OUTPATIENT
Start: 2019-07-16 | End: 2019-07-16 | Stop reason: HOSPADM

## 2019-07-16 RX ORDER — FENTANYL CITRATE/PF 50 MCG/ML
50 SYRINGE (ML) INJECTION
Status: DISCONTINUED | OUTPATIENT
Start: 2019-07-16 | End: 2019-07-16 | Stop reason: HOSPADM

## 2019-07-16 RX ORDER — OXYCODONE HYDROCHLORIDE AND ACETAMINOPHEN 5; 325 MG/1; MG/1
1 TABLET ORAL EVERY 4 HOURS PRN
Status: CANCELLED | OUTPATIENT
Start: 2019-07-16

## 2019-07-16 RX ORDER — HYDROCODONE BITARTRATE AND ACETAMINOPHEN 5; 325 MG/1; MG/1
1-2 TABLET ORAL EVERY 6 HOURS PRN
Qty: 20 TABLET | Refills: 0 | Status: SHIPPED | OUTPATIENT
Start: 2019-07-16 | End: 2019-07-26

## 2019-07-16 RX ORDER — CEPHALEXIN 500 MG/1
500 CAPSULE ORAL EVERY 12 HOURS SCHEDULED
Qty: 10 CAPSULE | Refills: 0 | Status: SHIPPED | OUTPATIENT
Start: 2019-07-16 | End: 2019-07-21

## 2019-07-16 RX ORDER — FENTANYL CITRATE 50 UG/ML
INJECTION, SOLUTION INTRAMUSCULAR; INTRAVENOUS AS NEEDED
Status: DISCONTINUED | OUTPATIENT
Start: 2019-07-16 | End: 2019-07-16 | Stop reason: SURG

## 2019-07-16 RX ORDER — ONDANSETRON 2 MG/ML
INJECTION INTRAMUSCULAR; INTRAVENOUS AS NEEDED
Status: DISCONTINUED | OUTPATIENT
Start: 2019-07-16 | End: 2019-07-16 | Stop reason: SURG

## 2019-07-16 RX ORDER — LIDOCAINE HYDROCHLORIDE 10 MG/ML
INJECTION, SOLUTION INFILTRATION; PERINEURAL AS NEEDED
Status: DISCONTINUED | OUTPATIENT
Start: 2019-07-16 | End: 2019-07-16 | Stop reason: SURG

## 2019-07-16 RX ORDER — ONDANSETRON 2 MG/ML
4 INJECTION INTRAMUSCULAR; INTRAVENOUS ONCE AS NEEDED
Status: DISCONTINUED | OUTPATIENT
Start: 2019-07-16 | End: 2019-07-16 | Stop reason: HOSPADM

## 2019-07-16 RX ORDER — PROPOFOL 10 MG/ML
INJECTION, EMULSION INTRAVENOUS AS NEEDED
Status: DISCONTINUED | OUTPATIENT
Start: 2019-07-16 | End: 2019-07-16 | Stop reason: SURG

## 2019-07-16 RX ADMIN — FENTANYL CITRATE 100 MCG: 50 INJECTION, SOLUTION INTRAMUSCULAR; INTRAVENOUS at 09:19

## 2019-07-16 RX ADMIN — FENTANYL CITRATE 25 MCG: 50 INJECTION, SOLUTION INTRAMUSCULAR; INTRAVENOUS at 09:42

## 2019-07-16 RX ADMIN — SODIUM CHLORIDE 125 ML/HR: 0.9 INJECTION, SOLUTION INTRAVENOUS at 08:25

## 2019-07-16 RX ADMIN — ONDANSETRON HYDROCHLORIDE 4 MG: 2 INJECTION, SOLUTION INTRAVENOUS at 09:25

## 2019-07-16 RX ADMIN — PROPOFOL 200 MG: 10 INJECTION, EMULSION INTRAVENOUS at 09:21

## 2019-07-16 RX ADMIN — FENTANYL CITRATE 50 MCG: 50 INJECTION INTRAMUSCULAR; INTRAVENOUS at 10:26

## 2019-07-16 RX ADMIN — LIDOCAINE HYDROCHLORIDE 50 MG: 10 INJECTION, SOLUTION INFILTRATION; PERINEURAL at 09:21

## 2019-07-16 RX ADMIN — DEXAMETHASONE SODIUM PHOSPHATE 4 MG: 10 INJECTION, SOLUTION INTRAMUSCULAR; INTRAVENOUS at 09:25

## 2019-07-16 RX ADMIN — CEFAZOLIN SODIUM 2000 MG: 2 SOLUTION INTRAVENOUS at 09:16

## 2019-07-16 RX ADMIN — SODIUM CHLORIDE: 0.9 INJECTION, SOLUTION INTRAVENOUS at 09:30

## 2019-07-16 NOTE — DISCHARGE INSTRUCTIONS
ALL YOUR  PREVIOUS MEDS ARE THE SAME  NEW MEDS:  Hydrocodone if needed for pain  Cephalexin twice per day for 5 days  You can get in shower with catheter  EXPECT SOME BLOOD IN URINE, BURNING, FREQUENT URINATION

## 2019-07-16 NOTE — DISCHARGE SUMMARY
Discharge Summary - Kacie Mcarthur 77 y o  male MRN: [id number]    Admission Date: 7/16/2019     Admitting Diagnosis: Bladder tumor [D49 4]    Procedures Performed: TUR bladder tumor, retros, mitomycin    Patient underwent planned outpt surgery and recovered without complication  Discharged in good condition  Medications were prescribed  Pt knows to have office follow-up at the appropriate time

## 2019-07-16 NOTE — INTERVAL H&P NOTE
H&P reviewed  After examining the patient I find no changes in the patients condition since the H&P had been written    /81   Pulse 69   Temp 97 6 °F (36 4 °C) (Tympanic)   Resp 16   Ht 5' 9" (1 753 m)   Wt 79 4 kg (175 lb)   SpO2 98%   BMI 25 84 kg/m²

## 2019-07-16 NOTE — ANESTHESIA POSTPROCEDURE EVALUATION
Post-Op Assessment Note    CV Status:  Stable  Pain Score: 1    Pain management: adequate     Mental Status:  Alert and awake   Hydration Status:  Euvolemic   PONV Controlled:  Controlled   Airway Patency:  Patent   Post Op Vitals Reviewed: Yes      Staff: Anesthesiologist           BP (!) 172/99 (07/16/19 1001)    Temp 97 7 °F (36 5 °C) (07/16/19 1001)    Pulse 72 (07/16/19 1001)   Resp 18 (07/16/19 1001)    SpO2 95 % (07/16/19 1001)

## 2019-07-16 NOTE — ANESTHESIA PREPROCEDURE EVALUATION
Review of Systems/Medical History  Patient summary reviewed  Chart reviewed  No history of anesthetic complications     Cardiovascular  Hyperlipidemia,    Pulmonary  Negative pulmonary ROS        GI/Hepatic  Negative GI/hepatic ROS          Prostatic disorder,        Endo/Other  Negative endo/other ROS      GYN  Negative gynecology ROS          Hematology  Negative hematology ROS      Musculoskeletal  Back pain , lumbar pain,        Neurology  Negative neurology ROS      Psychology   Negative psychology ROS              Physical Exam    Airway    Mallampati score: II  TM Distance: >3 FB  Neck ROM: full     Dental   No notable dental hx     Cardiovascular  Rhythm: regular, Rate: normal, Cardiovascular exam normal    Pulmonary  Pulmonary exam normal Breath sounds clear to auscultation,     Other Findings        Anesthesia Plan  ASA Score- 2     Anesthesia Type- general with ASA Monitors  Additional Monitors:   Airway Plan:     Comment: LMA vs ETT  Plan Factors-    Induction- intravenous  Postoperative Plan-     Informed Consent- Anesthetic plan and risks discussed with patient

## 2019-07-16 NOTE — OP NOTE
OPERATIVE REPORT  PATIENT NAME: Ryan Hedrick    :  1950  MRN: [id number]  Pt Location: [facility] 05    SURGERY DATE: 2019    Surgeon(s) and Role:     * Micheal Singer MD - Primary    Preop Diagnosis:  Bladder tumor [D49 4]    Post-Op Diagnosis Codes: * Bladder tumor [D49 4]    Procedure(s) (LRB):  TRANSURETHRAL RESECTION OF BLADDER TUMOR (TURBT) (N/A)  INSTILLATION MITOMYCIN (N/A)  CYSTOSCOPY WITH RETROGRADE PYELOGRAM (Bilateral)    Specimen(s):  ID Type Source Tests Collected by Time Destination   1 : Posterior bladdeer wall tumor Tissue Urinary Bladder TISSUE EXAM Micheal Singer MD 2019 0945        Estimated Blood Loss:   Minimal    Drains:  Urethral Catheter Double-lumen; Latex 20 Fr  (Active)   Number of days: 0       Anesthesia Type:   General    Operative Indications:  Bladder tumor [D49 4]      Operative Findings:  2 cm tumor right posterior wall  Normal retrogrades  Large prostate with friable blood vessels and significant bleeding    Complications:   None    Procedure and Technique:  After the patient was placed under adequate general anesthesia, he was prepped and draped using Betadine solution in lithotomy position  The 25 Romansh scope was passed without difficulty in the urethra which had no strictures  The prostate was significantly enlarged, all three lobes, with friable blood vessels that bled easily with passage of the scope  The bladder had moderate muscular trabeculations, tumor just right of the midline posterior wall  Bilateral retrograde pyelograms were performed in the standard fashion, finding no lesions, tumors, obstruction of the upper urinary tract  The resectoscope loop was used to resect the tumor into, but not through muscle  The resection went into deep muscle, but no recognized perforations  Specimens were taken at for sample, the edges of tumor sites were cauterized  There was no significant bleeding after this      The scope was removed, mitomycin instilled for 1 hour  Patient taken to recovery in good condition     I was present for the entire procedure    Patient Disposition:  PACU     SIGNATURE: Luis Nugent MD  DATE: July 16, 2019  TIME: 10:06 AM

## 2019-07-19 ENCOUNTER — PROCEDURE VISIT (OUTPATIENT)
Dept: UROLOGY | Facility: MEDICAL CENTER | Age: 69
End: 2019-07-19
Payer: MEDICARE

## 2019-07-19 ENCOUNTER — TELEPHONE (OUTPATIENT)
Dept: UROLOGY | Facility: MEDICAL CENTER | Age: 69
End: 2019-07-19

## 2019-07-19 VITALS
DIASTOLIC BLOOD PRESSURE: 80 MMHG | SYSTOLIC BLOOD PRESSURE: 148 MMHG | HEART RATE: 71 BPM | WEIGHT: 178 LBS | HEIGHT: 69 IN | BODY MASS INDEX: 26.36 KG/M2

## 2019-07-19 DIAGNOSIS — N32.89 BLADDER MASS: Primary | ICD-10-CM

## 2019-07-19 PROCEDURE — 99211 OFF/OP EST MAY X REQ PHY/QHP: CPT | Performed by: UROLOGY

## 2019-07-19 NOTE — PROGRESS NOTES
7/19/2019    Rebecca Bar is a 77 y o  male  [id number]    Diagnosis:  Bladder mass      Patient presents for Weems catheter removal s/p TURBT on 7/16/19 with Dr Rhea Baldwin  Plan:  Return next week for biopsy results  Procedure: Weems removed without difficulty, patient tolerated procedure well  Urine draining clear dark yellow  Denies fever or urinary symptoms  Patient instructed to increase fluids, especially water, and limit caffeine intake  To return to office if unable to void within 4-6 hours, or has increased discomfort      Vitals:    07/19/19 0943   BP: 148/80   Pulse: 71   Weight: 80 7 kg (178 lb)   Height: 5' 9" (1 753 m)         Frederick Melissa LPN

## 2019-07-19 NOTE — TELEPHONE ENCOUNTER
Pt called c/o some burning when he urinated  Pt had a grant catheter removed this morning so this is not uncommon  He states he is urinating fine and has a good stream   Instructed to increase his fluid intake, especially water, to keep his urine dilute

## 2019-07-23 ENCOUNTER — OFFICE VISIT (OUTPATIENT)
Dept: UROLOGY | Facility: MEDICAL CENTER | Age: 69
End: 2019-07-23
Payer: MEDICARE

## 2019-07-23 VITALS
HEART RATE: 62 BPM | HEIGHT: 69 IN | WEIGHT: 178 LBS | DIASTOLIC BLOOD PRESSURE: 76 MMHG | SYSTOLIC BLOOD PRESSURE: 130 MMHG | BODY MASS INDEX: 26.36 KG/M2

## 2019-07-23 DIAGNOSIS — C67.4 MALIGNANT NEOPLASM OF POSTERIOR WALL OF URINARY BLADDER (HCC): Primary | ICD-10-CM

## 2019-07-23 PROCEDURE — 99214 OFFICE O/P EST MOD 30 MIN: CPT | Performed by: UROLOGY

## 2019-07-23 NOTE — PROGRESS NOTES
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 with upper urinary tracts appearing normal on bilateral retrograde pyelography  Patient currently is voiding adequately with no gross hematuria urinary incontinence or significant obstructive irritative voiding symptoms  We discussed his enlarged prostate and symptoms of bladder outlet obstruction as well  Patient agrees to continued cystoscopic surveillance in regard to his bladder cancer  Intravesical chemotherapy or immunologic therapy is not indicated at this time  The patient will return in approximately 3 months prior to which time urinary cytology will be obtained  At the time of return he will undergo cystourethroscopy

## 2019-08-20 ENCOUNTER — OFFICE VISIT (OUTPATIENT)
Dept: URGENT CARE | Facility: CLINIC | Age: 69
End: 2019-08-20
Payer: MEDICARE

## 2019-08-20 VITALS
OXYGEN SATURATION: 96 % | DIASTOLIC BLOOD PRESSURE: 65 MMHG | WEIGHT: 178 LBS | SYSTOLIC BLOOD PRESSURE: 123 MMHG | HEART RATE: 96 BPM | RESPIRATION RATE: 20 BRPM | HEIGHT: 69 IN | TEMPERATURE: 96.1 F | BODY MASS INDEX: 26.36 KG/M2

## 2019-08-20 DIAGNOSIS — T63.481A INSECT STINGS, ACCIDENTAL OR UNINTENTIONAL, INITIAL ENCOUNTER: Primary | ICD-10-CM

## 2019-08-20 PROCEDURE — 96372 THER/PROPH/DIAG INJ SC/IM: CPT | Performed by: PHYSICIAN ASSISTANT

## 2019-08-20 PROCEDURE — G0463 HOSPITAL OUTPT CLINIC VISIT: HCPCS | Performed by: PHYSICIAN ASSISTANT

## 2019-08-20 PROCEDURE — 99213 OFFICE O/P EST LOW 20 MIN: CPT | Performed by: PHYSICIAN ASSISTANT

## 2019-08-20 RX ORDER — METHYLPREDNISOLONE SODIUM SUCCINATE 125 MG/2ML
125 INJECTION, POWDER, LYOPHILIZED, FOR SOLUTION INTRAMUSCULAR; INTRAVENOUS ONCE
Status: COMPLETED | OUTPATIENT
Start: 2019-08-20 | End: 2019-08-20

## 2019-08-20 RX ORDER — EPINEPHRINE 1 MG/ML
0.5 INJECTION, SOLUTION, CONCENTRATE INTRAVENOUS ONCE
Status: COMPLETED | OUTPATIENT
Start: 2019-08-20 | End: 2019-08-20

## 2019-08-20 RX ORDER — EPINEPHRINE 0.3 MG/.3ML
0.5 INJECTION SUBCUTANEOUS ONCE
Qty: 0.5 ML | Refills: 0 | Status: SHIPPED | OUTPATIENT
Start: 2019-08-20 | End: 2021-12-15

## 2019-08-20 RX ADMIN — METHYLPREDNISOLONE SODIUM SUCCINATE 125 MG: 125 INJECTION, POWDER, LYOPHILIZED, FOR SOLUTION INTRAMUSCULAR; INTRAVENOUS at 17:23

## 2019-08-20 RX ADMIN — EPINEPHRINE 0.5 MG: 1 INJECTION, SOLUTION, CONCENTRATE INTRAVENOUS at 17:27
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
## 2019-09-30 ENCOUNTER — TELEPHONE (OUTPATIENT)
Dept: UROLOGY | Facility: MEDICAL CENTER | Age: 69
End: 2019-09-30

## 2019-09-30 NOTE — TELEPHONE ENCOUNTER
Patient of Dr Jamia Diggs seen in the [facility] office  Patient advised that he has a Cysto on 10/16/19  Patient would like to know if he needs any labwork done before his appointment  Please advise

## 2019-10-16 ENCOUNTER — PROCEDURE VISIT (OUTPATIENT)
Dept: UROLOGY | Facility: MEDICAL CENTER | Age: 69
End: 2019-10-16
Payer: MEDICARE

## 2019-10-16 VITALS
SYSTOLIC BLOOD PRESSURE: 126 MMHG | BODY MASS INDEX: 26.36 KG/M2 | HEART RATE: 76 BPM | WEIGHT: 178 LBS | HEIGHT: 69 IN | DIASTOLIC BLOOD PRESSURE: 74 MMHG

## 2019-10-16 DIAGNOSIS — N40.1 BPH WITH URINARY OBSTRUCTION: ICD-10-CM

## 2019-10-16 DIAGNOSIS — N13.8 BPH WITH URINARY OBSTRUCTION: ICD-10-CM

## 2019-10-16 DIAGNOSIS — Z85.51 HISTORY OF BLADDER CANCER: Primary | ICD-10-CM

## 2019-10-16 LAB
SL AMB  POCT GLUCOSE, UA: NEGATIVE
SL AMB LEUKOCYTE ESTERASE,UA: NEGATIVE
SL AMB POCT BILIRUBIN,UA: NEGATIVE
SL AMB POCT BLOOD,UA: NEGATIVE
SL AMB POCT CLARITY,UA: CLEAR
SL AMB POCT COLOR,UA: YELLOW
SL AMB POCT KETONES,UA: NEGATIVE
SL AMB POCT NITRITE,UA: NEGATIVE
SL AMB POCT PH,UA: 6.5
SL AMB POCT SPECIFIC GRAVITY,UA: 1.01
SL AMB POCT URINE PROTEIN: NEGATIVE
SL AMB POCT UROBILINOGEN: 0.2

## 2019-10-16 PROCEDURE — 81003 URINALYSIS AUTO W/O SCOPE: CPT | Performed by: UROLOGY

## 2019-10-16 PROCEDURE — 99213 OFFICE O/P EST LOW 20 MIN: CPT | Performed by: UROLOGY

## 2019-10-16 RX ORDER — TRIAMCINOLONE ACETONIDE 1 MG/G
1 CREAM TOPICAL
COMMUNITY
Start: 2019-10-11
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
## 2019-10-17 PROBLEM — Z85.51 HISTORY OF BLADDER CANCER: Status: ACTIVE | Noted: 2019-10-17

## 2019-10-17 PROCEDURE — 52000 CYSTOURETHROSCOPY: CPT | Performed by: UROLOGY
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
## 2020-01-21 RX ORDER — CARBOXYMETHYLCELLULOSE SODIUM 5 MG/ML
1 SOLUTION/ DROPS OPHTHALMIC
COMMUNITY

## 2020-01-21 RX ORDER — ANTIOX #8/OM3/DHA/EPA/LUT/ZEAX 250-2.5 MG
CAPSULE ORAL
COMMUNITY
Start: 2019-11-20

## 2020-01-22 ENCOUNTER — PROCEDURE VISIT (OUTPATIENT)
Dept: UROLOGY | Facility: MEDICAL CENTER | Age: 70
End: 2020-01-22
Payer: MEDICARE

## 2020-01-22 VITALS
HEIGHT: 69 IN | DIASTOLIC BLOOD PRESSURE: 82 MMHG | SYSTOLIC BLOOD PRESSURE: 122 MMHG | WEIGHT: 180 LBS | HEART RATE: 70 BPM | BODY MASS INDEX: 26.66 KG/M2

## 2020-01-22 DIAGNOSIS — E29.1 MALE HYPOGONADISM: ICD-10-CM

## 2020-01-22 DIAGNOSIS — N52.8 OTHER MALE ERECTILE DYSFUNCTION: ICD-10-CM

## 2020-01-22 DIAGNOSIS — Z85.51 HISTORY OF BLADDER CANCER: Primary | ICD-10-CM

## 2020-01-22 LAB
SL AMB  POCT GLUCOSE, UA: NORMAL
SL AMB LEUKOCYTE ESTERASE,UA: NORMAL
SL AMB POCT BILIRUBIN,UA: NORMAL
SL AMB POCT BLOOD,UA: NORMAL
SL AMB POCT CLARITY,UA: CLEAR
SL AMB POCT COLOR,UA: YELLOW
SL AMB POCT KETONES,UA: NORMAL
SL AMB POCT NITRITE,UA: NORMAL
SL AMB POCT PH,UA: 5.5
SL AMB POCT SPECIFIC GRAVITY,UA: 1.02
SL AMB POCT URINE PROTEIN: NORMAL
SL AMB POCT UROBILINOGEN: 0.2

## 2020-01-22 PROCEDURE — 99213 OFFICE O/P EST LOW 20 MIN: CPT | Performed by: UROLOGY

## 2020-01-22 PROCEDURE — 52000 CYSTOURETHROSCOPY: CPT | Performed by: UROLOGY

## 2020-01-22 PROCEDURE — 81003 URINALYSIS AUTO W/O SCOPE: CPT | Performed by: UROLOGY

## 2020-01-22 RX ORDER — TADALAFIL 20 MG/1
TABLET ORAL
Qty: 40 TABLET | Refills: 1 | Status: SHIPPED | OUTPATIENT
Start: 2020-01-22 | End: 2021-03-24 | Stop reason: SDUPTHER
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
## 2020-01-23 DIAGNOSIS — E29.1 MALE HYPOGONADISM: ICD-10-CM

## 2020-01-23 NOTE — TELEPHONE ENCOUNTER
----- Message from Steph Rosario MD sent at 1/22/2020 11:00 AM EST -----   Do things I forgot to addressed with him at today's office visit:  1  Needs blood test for testosterone level, order is in, tell him it is not fasting but should be before 9:00 a m  At a [facility] lab  2  Wants refill of his compounded testosterone  I could not refill it on computer because the documentation say it was called in to [address]  Can you look into it, see if he can refill it that way or do I need to send a new script

## 2020-01-23 NOTE — TELEPHONE ENCOUNTER
Spoke with pt  He will have testosterone level drawn prior to April ov  He does need a new rx for the compounded testosterone, it appears it was called into the pharmacy verbally the last time  He is asking for 3 month supply at a time, instead of 1 month

## 2020-01-30 NOTE — TELEPHONE ENCOUNTER
[address] information is well documented within the chart as is the medication which was called in back in July, 2019  The patient was last seen on 1/22/20 by Dr Mary Campbell in the [facility] location; continuation of the medication was authorized at that time    Request for same, 30 day supply with 5 refills was queued and forwarded to the Advanced Practitioner covering the [facility] location for approval

## 2020-04-22 ENCOUNTER — TELEPHONE (OUTPATIENT)
Dept: UROLOGY | Facility: MEDICAL CENTER | Age: 70
End: 2020-04-22

## 2020-04-22 ENCOUNTER — LAB (OUTPATIENT)
Dept: LAB | Facility: MEDICAL CENTER | Age: 70
End: 2020-04-22
Attending: UROLOGY
Payer: MEDICARE

## 2020-04-22 DIAGNOSIS — E29.1 MALE HYPOGONADISM: ICD-10-CM

## 2020-04-22 LAB — TESTOST SERPL-MCNC: 320 NG/DL (ref 95–948)

## 2020-04-22 PROCEDURE — 84403 ASSAY OF TOTAL TESTOSTERONE: CPT

## 2020-04-22 PROCEDURE — 36415 COLL VENOUS BLD VENIPUNCTURE: CPT

## 2020-06-17 ENCOUNTER — PROCEDURE VISIT (OUTPATIENT)
Dept: UROLOGY | Facility: MEDICAL CENTER | Age: 70
End: 2020-06-17
Payer: MEDICARE

## 2020-06-17 VITALS
WEIGHT: 180 LBS | DIASTOLIC BLOOD PRESSURE: 78 MMHG | BODY MASS INDEX: 26.66 KG/M2 | TEMPERATURE: 97.1 F | SYSTOLIC BLOOD PRESSURE: 122 MMHG | HEIGHT: 69 IN

## 2020-06-17 DIAGNOSIS — Z85.51 HISTORY OF BLADDER CANCER: Primary | ICD-10-CM

## 2020-06-17 PROCEDURE — 52000 CYSTOURETHROSCOPY: CPT | Performed by: UROLOGY

## 2020-06-17 PROCEDURE — 81003 URINALYSIS AUTO W/O SCOPE: CPT | Performed by: UROLOGY

## 2020-06-17 PROCEDURE — 99213 OFFICE O/P EST LOW 20 MIN: CPT | Performed by: UROLOGY

## 2020-06-17 RX ORDER — FLUTICASONE PROPIONATE 50 MCG
1 SPRAY, SUSPENSION (ML) NASAL DAILY
COMMUNITY

## 2020-09-01 DIAGNOSIS — E29.1 MALE HYPOGONADISM: ICD-10-CM

## 2020-09-01 NOTE — TELEPHONE ENCOUNTER
The patient has an upcoming office visit scheduled for 9/23/20 with Dr Dorrene Saint in the [facility] location but will run out of medication until then    Request for same, 30 day supply with 1 refill was queued and forwarded to the Advanced Practitioner covering the [facility] location for approval

## 2020-09-01 NOTE — TELEPHONE ENCOUNTER
Patient left a message on the Medication Refill voice mail line requesting a new prescription for Testosterone cream to [facility]  Apparently the pharmacy sent a fax but hasn't heard back from our office  Return the call to the patient regarding status

## 2020-09-23 ENCOUNTER — PROCEDURE VISIT (OUTPATIENT)
Dept: UROLOGY | Facility: MEDICAL CENTER | Age: 70
End: 2020-09-23
Payer: MEDICARE

## 2020-09-23 VITALS
HEART RATE: 62 BPM | SYSTOLIC BLOOD PRESSURE: 122 MMHG | TEMPERATURE: 97.8 F | BODY MASS INDEX: 26.66 KG/M2 | WEIGHT: 180 LBS | HEIGHT: 69 IN | DIASTOLIC BLOOD PRESSURE: 80 MMHG

## 2020-09-23 DIAGNOSIS — E29.1 MALE HYPOGONADISM: ICD-10-CM

## 2020-09-23 DIAGNOSIS — N13.8 BPH WITH URINARY OBSTRUCTION: ICD-10-CM

## 2020-09-23 DIAGNOSIS — Z85.51 HISTORY OF BLADDER CANCER: Primary | ICD-10-CM

## 2020-09-23 DIAGNOSIS — N40.1 BPH WITH URINARY OBSTRUCTION: ICD-10-CM

## 2020-09-23 LAB
SL AMB  POCT GLUCOSE, UA: NORMAL
SL AMB LEUKOCYTE ESTERASE,UA: NORMAL
SL AMB POCT BILIRUBIN,UA: NORMAL
SL AMB POCT BLOOD,UA: NORMAL
SL AMB POCT CLARITY,UA: CLEAR
SL AMB POCT COLOR,UA: YELLOW
SL AMB POCT KETONES,UA: NORMAL
SL AMB POCT NITRITE,UA: NORMAL
SL AMB POCT PH,UA: 5
SL AMB POCT SPECIFIC GRAVITY,UA: 1.02
SL AMB POCT URINE PROTEIN: NORMAL
SL AMB POCT UROBILINOGEN: 0.2

## 2020-09-23 PROCEDURE — 99213 OFFICE O/P EST LOW 20 MIN: CPT | Performed by: UROLOGY

## 2020-09-23 PROCEDURE — 81003 URINALYSIS AUTO W/O SCOPE: CPT | Performed by: UROLOGY

## 2020-09-23 PROCEDURE — 52000 CYSTOURETHROSCOPY: CPT | Performed by: UROLOGY
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
## 2020-11-16 ENCOUNTER — LAB (OUTPATIENT)
Dept: LAB | Facility: MEDICAL CENTER | Age: 70
End: 2020-11-16
Attending: UROLOGY
Payer: MEDICARE

## 2020-11-16 DIAGNOSIS — N40.1 BPH WITH URINARY OBSTRUCTION: ICD-10-CM

## 2020-11-16 DIAGNOSIS — N13.8 BPH WITH URINARY OBSTRUCTION: ICD-10-CM

## 2020-11-16 LAB — PSA SERPL-MCNC: 3.9 NG/ML (ref 0–4)

## 2020-11-16 PROCEDURE — 84153 ASSAY OF PSA TOTAL: CPT

## 2020-12-16 ENCOUNTER — PROCEDURE VISIT (OUTPATIENT)
Dept: UROLOGY | Facility: MEDICAL CENTER | Age: 70
End: 2020-12-16

## 2020-12-16 VITALS — BODY MASS INDEX: 26.66 KG/M2 | WEIGHT: 180 LBS | HEIGHT: 69 IN

## 2020-12-16 DIAGNOSIS — N40.1 BPH WITH URINARY OBSTRUCTION: ICD-10-CM

## 2020-12-16 DIAGNOSIS — Z85.51 HISTORY OF BLADDER CANCER: Primary | ICD-10-CM

## 2020-12-16 DIAGNOSIS — N13.8 BPH WITH URINARY OBSTRUCTION: ICD-10-CM

## 2020-12-16 PROCEDURE — 52000 CYSTOURETHROSCOPY: CPT | Performed by: UROLOGY

## 2020-12-16 PROCEDURE — 99213 OFFICE O/P EST LOW 20 MIN: CPT | Performed by: UROLOGY

## 2020-12-16 RX ORDER — POLYVINYL ALCOHOL, POVIDONE 14; 6 MG/ML; MG/ML
SOLUTION/ DROPS OPHTHALMIC
COMMUNITY

## 2020-12-16 RX ORDER — MOMETASONE FUROATE 1 MG/G
CREAM TOPICAL DAILY
COMMUNITY

## 2020-12-16 RX ORDER — PRAVASTATIN SODIUM 10 MG
TABLET ORAL
COMMUNITY
Start: 2020-09-24

## 2021-02-22 DIAGNOSIS — E29.1 MALE HYPOGONADISM: ICD-10-CM

## 2021-02-22 NOTE — TELEPHONE ENCOUNTER
An Auto-fax Refill Request for Testosterone 300mg/mL was received from [facility]  The patient has an upcoming office visit scheduled for 3/24/21 with Dr Kath Duran in the [facility] location but will run out of medication until then    Request for same, 30 day supply with 1 refill was queued and forwarded to the Advanced Practitioner covering the [facility] location for approval

## 2021-03-10 DIAGNOSIS — Z23 ENCOUNTER FOR IMMUNIZATION: ICD-10-CM

## 2021-03-24 ENCOUNTER — PROCEDURE VISIT (OUTPATIENT)
Dept: UROLOGY | Facility: MEDICAL CENTER | Age: 71
End: 2021-03-24
Payer: MEDICARE

## 2021-03-24 VITALS
DIASTOLIC BLOOD PRESSURE: 80 MMHG | HEIGHT: 69 IN | SYSTOLIC BLOOD PRESSURE: 132 MMHG | WEIGHT: 180 LBS | BODY MASS INDEX: 26.66 KG/M2

## 2021-03-24 DIAGNOSIS — Z85.51 HISTORY OF BLADDER CANCER: Primary | ICD-10-CM

## 2021-03-24 DIAGNOSIS — N52.8 OTHER MALE ERECTILE DYSFUNCTION: ICD-10-CM

## 2021-03-24 PROCEDURE — 52000 CYSTOURETHROSCOPY: CPT | Performed by: UROLOGY

## 2021-03-24 PROCEDURE — 99213 OFFICE O/P EST LOW 20 MIN: CPT | Performed by: UROLOGY

## 2021-03-24 PROCEDURE — 81003 URINALYSIS AUTO W/O SCOPE: CPT | Performed by: UROLOGY

## 2021-03-24 RX ORDER — TADALAFIL 20 MG/1
TABLET ORAL
Qty: 40 TABLET | Refills: 1 | Status: SHIPPED | OUTPATIENT
Start: 2021-03-24
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
## 2021-03-26 NOTE — TELEPHONE ENCOUNTER
Patient left a message on the Medication Refill voice mail line requesting a new prescription for Testosterone Cream 300mg/mL to [facility]  According to our records, patient still has one more refill remaining  I called [facility] to get that processing  I also spoke with patient regarding same  He should call me at the end of next month for additional refills  No further action required

## 2021-04-14 ENCOUNTER — TELEPHONE (OUTPATIENT)
Dept: UROLOGY | Facility: AMBULATORY SURGERY CENTER | Age: 71
End: 2021-04-14

## 2021-04-14 ENCOUNTER — APPOINTMENT (OUTPATIENT)
Dept: LAB | Facility: HOSPITAL | Age: 71
End: 2021-04-14
Payer: MEDICARE

## 2021-04-14 ENCOUNTER — TELEPHONE (OUTPATIENT)
Dept: UROLOGY | Facility: CLINIC | Age: 71
End: 2021-04-14

## 2021-04-14 DIAGNOSIS — R30.0 BURNING WITH URINATION: ICD-10-CM

## 2021-04-14 DIAGNOSIS — N39.0 URINARY TRACT INFECTION WITHOUT HEMATURIA, SITE UNSPECIFIED: Primary | ICD-10-CM

## 2021-04-14 DIAGNOSIS — R30.0 BURNING WITH URINATION: Primary | ICD-10-CM

## 2021-04-14 LAB
BACTERIA UR QL AUTO: ABNORMAL /HPF
BILIRUB UR QL STRIP: NEGATIVE
CLARITY UR: ABNORMAL
COLOR UR: YELLOW
GLUCOSE UR STRIP-MCNC: NEGATIVE MG/DL
HGB UR QL STRIP.AUTO: ABNORMAL
KETONES UR STRIP-MCNC: NEGATIVE MG/DL
LEUKOCYTE ESTERASE UR QL STRIP: ABNORMAL
NITRITE UR QL STRIP: POSITIVE
NON-SQ EPI CELLS URNS QL MICRO: ABNORMAL /HPF
PH UR STRIP.AUTO: 6 [PH]
PROT UR STRIP-MCNC: NEGATIVE MG/DL
RBC #/AREA URNS AUTO: ABNORMAL /HPF
SP GR UR STRIP.AUTO: 1.02 (ref 1–1.03)
UROBILINOGEN UR QL STRIP.AUTO: 0.2 E.U./DL
WBC #/AREA URNS AUTO: ABNORMAL /HPF

## 2021-04-14 PROCEDURE — 87186 SC STD MICRODIL/AGAR DIL: CPT

## 2021-04-14 PROCEDURE — 81001 URINALYSIS AUTO W/SCOPE: CPT

## 2021-04-14 PROCEDURE — 87086 URINE CULTURE/COLONY COUNT: CPT

## 2021-04-14 PROCEDURE — 87077 CULTURE AEROBIC IDENTIFY: CPT

## 2021-04-14 RX ORDER — CEPHALEXIN 500 MG/1
500 CAPSULE ORAL EVERY 6 HOURS SCHEDULED
Qty: 28 CAPSULE | Refills: 0 | Status: SHIPPED | OUTPATIENT
Start: 2021-04-14 | End: 2021-04-21

## 2021-04-14 NOTE — TELEPHONE ENCOUNTER
DUPLICATE ENCOUNTER  Message copied and pasted into original phone encounter dated earlier 4/14/21  Please refer to that encounter for documentation  DO NOT DOCUMENT FURTHER ON THIS ENCOUNTER

## 2021-04-14 NOTE — TELEPHONE ENCOUNTER
Lc [place], [place]         4/14/21 2:46 PM  Note     Called patient to inform him of positive urine testing , also to inform him of antibiotic sent to his pharmacy   No answer left detailed message on machine as communication consent approved

## 2021-04-14 NOTE — TELEPHONE ENCOUNTER
Prescription for Keflex sent to pharmacy on file in response to positive urine testing for infection

## 2021-04-14 NOTE — TELEPHONE ENCOUNTER
CHE Patel         4/14/21 2:05 PM  Note      Prescription for Keflex sent to pharmacy on file in response to positive urine testing for infection

## 2021-04-14 NOTE — TELEPHONE ENCOUNTER
Spoke with pt, he is c/o slightly foul odor x1-2 days, burning with urination, mostly at end of stream   Denies abd/back pain, blood in urine, fever  UA/UC orders placed, pt advised we will call with results 48-72 hours, advised aggressive hydration, avoid bladder irritants, call back for worsening symptoms

## 2021-04-14 NOTE — TELEPHONE ENCOUNTER
Called patient to inform him of positive urine testing , also to inform him of antibiotic sent to his pharmacy   No answer left detailed message on machine as communication consent approved

## 2021-04-16 LAB — BACTERIA UR CULT: ABNORMAL

## 2021-06-11 ENCOUNTER — TELEPHONE (OUTPATIENT)
Dept: UROLOGY | Facility: MEDICAL CENTER | Age: 71
End: 2021-06-11

## 2021-06-11 DIAGNOSIS — E29.1 MALE HYPOGONADISM: ICD-10-CM

## 2021-06-11 NOTE — TELEPHONE ENCOUNTER
Patient left a message on the Medication Refill voice mail line requesting a new prescription for Testosterone 10 % cream (300mg) per day to [facility]  Quantity was not specified

## 2021-06-14 NOTE — TELEPHONE ENCOUNTER
Patient left a message on the Medication Refill voice mail line 6/14/21 11:09am in regards to this request  I called patient back to confirm that it is in progress

## 2021-06-15 NOTE — TELEPHONE ENCOUNTER
Recent labs need to be performed before prescription refill will be provided   Orders placed for Testosterone and CBC to be performed

## 2021-06-16 NOTE — TELEPHONE ENCOUNTER
I the patient's concerns about repeat blood work and why it's needed  His concern is that he technically runs out of mediation as of tomorrow and his levels will fluctuate significantly if not maintained  Again, I emphasized the importance of follow-up blood work and how testosterone replacement therapy can adversely affect the body if not managed appropriately  Patient verbalized understanding  He plans to have repeat CBC and testosterone level performed early AM tomorrow (6/17/21)  Results should be available next day and VIRIDIANA Arias should be available for consultation regarding results  Script should then be called into [address] [place] [address] as soon as possible so they can begin compounding his medication  In my absence I left this message with Mikayla Woods LPN from the Triage Pool

## 2021-06-16 NOTE — TELEPHONE ENCOUNTER
Patient called in for status of medication refill, advised patient of [facility] notes  Patient stated he never had to get labs prior to getting the medication and asked why he needs this done now   Patient is requesting a call back at [phone]

## 2021-06-17 ENCOUNTER — APPOINTMENT (OUTPATIENT)
Dept: LAB | Facility: CLINIC | Age: 71
End: 2021-06-17
Payer: MEDICARE

## 2021-06-17 DIAGNOSIS — E29.1 MALE HYPOGONADISM: ICD-10-CM

## 2021-06-17 LAB
ERYTHROCYTE [DISTWIDTH] IN BLOOD BY AUTOMATED COUNT: 13.2 % (ref 11.6–15.1)
HCT VFR BLD AUTO: 45.8 % (ref 36.5–49.3)
HGB BLD-MCNC: 15 G/DL (ref 12–17)
MCH RBC QN AUTO: 29.7 PG (ref 26.8–34.3)
MCHC RBC AUTO-ENTMCNC: 32.8 G/DL (ref 31.4–37.4)
MCV RBC AUTO: 91 FL (ref 82–98)
PLATELET # BLD AUTO: 275 THOUSANDS/UL (ref 149–390)
PMV BLD AUTO: 10.1 FL (ref 8.9–12.7)
RBC # BLD AUTO: 5.05 MILLION/UL (ref 3.88–5.62)
TESTOST SERPL-MCNC: 182 NG/DL (ref 95–948)
WBC # BLD AUTO: 6.2 THOUSAND/UL (ref 4.31–10.16)

## 2021-06-17 PROCEDURE — 84403 ASSAY OF TOTAL TESTOSTERONE: CPT

## 2021-06-17 PROCEDURE — 36415 COLL VENOUS BLD VENIPUNCTURE: CPT

## 2021-06-17 PROCEDURE — 85027 COMPLETE CBC AUTOMATED: CPT

## 2021-06-18 NOTE — TELEPHONE ENCOUNTER
At the advise from [facility]  forward below directly to Tia BOLTON    Please be advised pts most recent blood work results dated 6/17/21 are now available for review  Pt is currently without medication  Pt requesting a refill on the following      EC-RX TESTOSTERONE 43% CREA   4 clicks (572SC) to be applied topically once daily  refills x 5  Dispensing 30 day supply = 30 GM container      Medication is to be called into [facility]  Medication must be compounded  Please call [phone]  to prescribe  Please respond asap  Thank you

## 2021-06-21 DIAGNOSIS — E29.1 MALE HYPOGONADISM: ICD-10-CM

## 2021-06-21 NOTE — TELEPHONE ENCOUNTER
Call placed to patient and spoke with him  Informed him of the recommendations of the CRNP at this time  Pt is aware and understands and will have lab testing done in 6 months as recommended

## 2021-06-21 NOTE — TELEPHONE ENCOUNTER
Testosterone prescription sent to pharmacy on file  Please have patient repeat testosterone level and CBC in 6 months   PSA due 11/2021 with f/u in the office in 12/2021

## 2021-06-23 ENCOUNTER — PROCEDURE VISIT (OUTPATIENT)
Dept: UROLOGY | Facility: MEDICAL CENTER | Age: 71
End: 2021-06-23
Payer: MEDICARE

## 2021-06-23 VITALS
SYSTOLIC BLOOD PRESSURE: 134 MMHG | BODY MASS INDEX: 26.66 KG/M2 | WEIGHT: 180 LBS | HEIGHT: 69 IN | DIASTOLIC BLOOD PRESSURE: 82 MMHG

## 2021-06-23 DIAGNOSIS — E29.1 MALE HYPOGONADISM: Primary | ICD-10-CM

## 2021-06-23 DIAGNOSIS — Z85.51 HISTORY OF BLADDER CANCER: ICD-10-CM

## 2021-06-23 DIAGNOSIS — N39.0 URINARY TRACT INFECTION WITHOUT HEMATURIA, SITE UNSPECIFIED: ICD-10-CM

## 2021-06-23 PROCEDURE — 52000 CYSTOURETHROSCOPY: CPT | Performed by: UROLOGY

## 2021-06-23 PROCEDURE — 99213 OFFICE O/P EST LOW 20 MIN: CPT | Performed by: UROLOGY

## 2021-06-23 RX ORDER — NITROFURANTOIN 25; 75 MG/1; MG/1
100 CAPSULE ORAL 2 TIMES DAILY
Qty: 6 CAPSULE | Refills: 0 | Status: SHIPPED | OUTPATIENT
Start: 2021-06-23
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
## 2021-07-12 NOTE — TELEPHONE ENCOUNTER
Patient called me directly stating he had trouble obtaining his Testosterone cream while I was away on vacation  His script when to [facility] in error  He is going away on vacation at the end of the week and needs his prescription as soon as possible  He also needs refills associated with the as well      Script for the requested medication was queued and forwarded to the Advanced Practitioner covering the [facility] location for approval

## 2021-12-07 ENCOUNTER — TELEPHONE (OUTPATIENT)
Dept: OTHER | Facility: OTHER | Age: 71
End: 2021-12-07

## 2021-12-07 ENCOUNTER — TELEPHONE (OUTPATIENT)
Dept: UROLOGY | Facility: AMBULATORY SURGERY CENTER | Age: 71
End: 2021-12-07

## 2021-12-08 ENCOUNTER — APPOINTMENT (OUTPATIENT)
Dept: LAB | Facility: CLINIC | Age: 71
End: 2021-12-08
Payer: MEDICARE

## 2021-12-08 DIAGNOSIS — E29.1 MALE HYPOGONADISM: ICD-10-CM

## 2021-12-08 LAB
ERYTHROCYTE [DISTWIDTH] IN BLOOD BY AUTOMATED COUNT: 13.6 % (ref 11.6–15.1)
HCT VFR BLD AUTO: 47.1 % (ref 36.5–49.3)
HGB BLD-MCNC: 15.3 G/DL (ref 12–17)
MCH RBC QN AUTO: 29.2 PG (ref 26.8–34.3)
MCHC RBC AUTO-ENTMCNC: 32.5 G/DL (ref 31.4–37.4)
MCV RBC AUTO: 90 FL (ref 82–98)
PLATELET # BLD AUTO: 273 THOUSANDS/UL (ref 149–390)
PMV BLD AUTO: 10.5 FL (ref 8.9–12.7)
RBC # BLD AUTO: 5.24 MILLION/UL (ref 3.88–5.62)
TESTOST SERPL-MCNC: 553 NG/DL (ref 95–948)
WBC # BLD AUTO: 6.84 THOUSAND/UL (ref 4.31–10.16)

## 2021-12-08 PROCEDURE — 84403 ASSAY OF TOTAL TESTOSTERONE: CPT

## 2021-12-08 PROCEDURE — 85027 COMPLETE CBC AUTOMATED: CPT

## 2021-12-08 PROCEDURE — 36415 COLL VENOUS BLD VENIPUNCTURE: CPT

## 2021-12-15 ENCOUNTER — PROCEDURE VISIT (OUTPATIENT)
Dept: UROLOGY | Facility: MEDICAL CENTER | Age: 71
End: 2021-12-15
Payer: MEDICARE

## 2021-12-15 VITALS
BODY MASS INDEX: 26.72 KG/M2 | DIASTOLIC BLOOD PRESSURE: 80 MMHG | WEIGHT: 180.4 LBS | HEIGHT: 69 IN | HEART RATE: 72 BPM | SYSTOLIC BLOOD PRESSURE: 116 MMHG

## 2021-12-15 DIAGNOSIS — Z85.51 HISTORY OF BLADDER CANCER: ICD-10-CM

## 2021-12-15 DIAGNOSIS — N40.1 BPH WITH URINARY OBSTRUCTION: Primary | ICD-10-CM

## 2021-12-15 DIAGNOSIS — N13.8 BPH WITH URINARY OBSTRUCTION: Primary | ICD-10-CM

## 2021-12-15 PROCEDURE — 99213 OFFICE O/P EST LOW 20 MIN: CPT | Performed by: UROLOGY

## 2021-12-15 PROCEDURE — 52000 CYSTOURETHROSCOPY: CPT | Performed by: UROLOGY

## 2021-12-18 ENCOUNTER — OFFICE VISIT (OUTPATIENT)
Dept: URGENT CARE | Facility: CLINIC | Age: 71
End: 2021-12-18
Payer: MEDICARE

## 2021-12-18 VITALS
HEIGHT: 69 IN | OXYGEN SATURATION: 98 % | BODY MASS INDEX: 26.66 KG/M2 | TEMPERATURE: 97.5 F | RESPIRATION RATE: 18 BRPM | WEIGHT: 180 LBS | SYSTOLIC BLOOD PRESSURE: 139 MMHG | DIASTOLIC BLOOD PRESSURE: 85 MMHG | HEART RATE: 74 BPM

## 2021-12-18 DIAGNOSIS — R30.0 DYSURIA: Primary | ICD-10-CM

## 2021-12-18 LAB
SL AMB  POCT GLUCOSE, UA: NEGATIVE
SL AMB LEUKOCYTE ESTERASE,UA: ABNORMAL
SL AMB POCT BILIRUBIN,UA: NEGATIVE
SL AMB POCT BLOOD,UA: NEGATIVE
SL AMB POCT CLARITY,UA: CLEAR
SL AMB POCT COLOR,UA: YELLOW
SL AMB POCT KETONES,UA: NEGATIVE
SL AMB POCT NITRITE,UA: POSITIVE
SL AMB POCT PH,UA: 6
SL AMB POCT SPECIFIC GRAVITY,UA: 1
SL AMB POCT URINE PROTEIN: NEGATIVE
SL AMB POCT UROBILINOGEN: NEGATIVE

## 2021-12-18 PROCEDURE — G0463 HOSPITAL OUTPT CLINIC VISIT: HCPCS | Performed by: EMERGENCY MEDICINE

## 2021-12-18 PROCEDURE — 87077 CULTURE AEROBIC IDENTIFY: CPT | Performed by: EMERGENCY MEDICINE

## 2021-12-18 PROCEDURE — 99213 OFFICE O/P EST LOW 20 MIN: CPT | Performed by: EMERGENCY MEDICINE

## 2021-12-18 PROCEDURE — 81002 URINALYSIS NONAUTO W/O SCOPE: CPT | Performed by: EMERGENCY MEDICINE

## 2021-12-18 PROCEDURE — 87186 SC STD MICRODIL/AGAR DIL: CPT | Performed by: EMERGENCY MEDICINE

## 2021-12-18 PROCEDURE — 87086 URINE CULTURE/COLONY COUNT: CPT | Performed by: EMERGENCY MEDICINE

## 2021-12-18 RX ORDER — CEPHALEXIN 500 MG/1
500 CAPSULE ORAL EVERY 6 HOURS SCHEDULED
Qty: 28 CAPSULE | Refills: 0 | Status: SHIPPED | OUTPATIENT
Start: 2021-12-18 | End: 2021-12-25

## 2021-12-20 LAB — BACTERIA UR CULT: ABNORMAL

## 2022-01-11 DIAGNOSIS — E29.1 MALE HYPOGONADISM: ICD-10-CM

## 2022-01-11 NOTE — TELEPHONE ENCOUNTER
Patient called me directly to request a refill on Testosterone cream 10% to [facility]  The patient was last seen on 12/15/21 by Dr Maite Pressley in the [facility] location; continuation of the medication was authorized at that time    Request for same, 30 day supply with 5 refills was queued and forwarded to the Advanced Practitioner covering the [facility] location for approval

## 2022-09-07 DIAGNOSIS — E29.1 MALE HYPOGONADISM: ICD-10-CM

## 2022-09-07 DIAGNOSIS — N13.8 BPH WITH URINARY OBSTRUCTION: Primary | ICD-10-CM

## 2022-09-07 DIAGNOSIS — N40.1 BPH WITH URINARY OBSTRUCTION: Primary | ICD-10-CM

## 2022-09-07 NOTE — TELEPHONE ENCOUNTER
Patient called me directly to request a refill on Testosterone cream 10% to [address]      The patient has an upcoming office visit scheduled for 12/14/22 with Dr Pretty Penny in the [facility] location but will run out of medication until then  Request for same, 30 day supply with 5 refills was queued and forwarded to the Advanced Practitioner covering the [facility] location for approval     Patient also reminded to complete blood work prior to next appointment; he verbalized understanding and is planning to have that drawn on or around 12/1/22

## 2022-10-18 ENCOUNTER — TELEPHONE (OUTPATIENT)
Dept: OTHER | Facility: OTHER | Age: 72
End: 2022-10-18

## 2022-10-18 DIAGNOSIS — N50.89 TESTICULAR LUMP: Primary | ICD-10-CM

## 2022-10-18 NOTE — TELEPHONE ENCOUNTER
Patient has an upcoming yearly appt in December and is calling to ask if his scrotum can be checked for a possible growing cyst at that time  He denies pain  Please call to discuss

## 2022-10-20 NOTE — TELEPHONE ENCOUNTER
PT under care of:Smith      Pt last seen: 12/15/22     PT calling today because: Patient has a cyst on his testicle  Would like to know if he should get a ultrasound prior to his appointment in December  PT symptoms are- Patient can feel the cyst  Patient stated that it seemed to have gone away but has now returned         PT can be reached [phone]

## 2022-10-26 ENCOUNTER — HOSPITAL ENCOUNTER (OUTPATIENT)
Dept: ULTRASOUND IMAGING | Facility: MEDICAL CENTER | Age: 72
Discharge: HOME | End: 2022-10-26
Payer: MEDICARE

## 2022-10-26 DIAGNOSIS — N50.89 TESTICULAR LUMP: ICD-10-CM

## 2022-10-26 PROCEDURE — 76870 US EXAM SCROTUM: CPT

## 2022-10-31 NOTE — TELEPHONE ENCOUNTER
US resulted all good news, benign epididymal cysts around 1cm or less  No mass/tumor, and good blood flow both testes   OK to keep Dec/routine visit

## 2022-10-31 NOTE — TELEPHONE ENCOUNTER
Call placed to patient and spoke with him  Informed him of the US results as reviewed by AP  Pt is aware and understands  He will follow up in December in the office

## 2022-12-12 ENCOUNTER — APPOINTMENT (OUTPATIENT)
Dept: LAB | Facility: CLINIC | Age: 72
End: 2022-12-12

## 2022-12-12 DIAGNOSIS — N13.8 BPH WITH URINARY OBSTRUCTION: ICD-10-CM

## 2022-12-12 DIAGNOSIS — N40.1 BPH WITH URINARY OBSTRUCTION: ICD-10-CM

## 2022-12-12 LAB — PSA SERPL-MCNC: 5.5 NG/ML (ref 0–4)

## 2022-12-14 ENCOUNTER — PROCEDURE VISIT (OUTPATIENT)
Dept: UROLOGY | Facility: MEDICAL CENTER | Age: 72
End: 2022-12-14

## 2022-12-14 VITALS
DIASTOLIC BLOOD PRESSURE: 66 MMHG | WEIGHT: 183 LBS | BODY MASS INDEX: 27.11 KG/M2 | SYSTOLIC BLOOD PRESSURE: 118 MMHG | HEART RATE: 83 BPM | HEIGHT: 69 IN | OXYGEN SATURATION: 97 %

## 2022-12-14 DIAGNOSIS — Z85.51 HISTORY OF BLADDER CANCER: Primary | ICD-10-CM

## 2022-12-14 DIAGNOSIS — N52.8 OTHER MALE ERECTILE DYSFUNCTION: ICD-10-CM

## 2022-12-14 DIAGNOSIS — N13.8 BPH WITH URINARY OBSTRUCTION: ICD-10-CM

## 2022-12-14 DIAGNOSIS — E29.1 MALE HYPOGONADISM: ICD-10-CM

## 2022-12-14 DIAGNOSIS — N40.1 BPH WITH URINARY OBSTRUCTION: ICD-10-CM

## 2022-12-14 DIAGNOSIS — R97.20 ELEVATED PROSTATE SPECIFIC ANTIGEN (PSA): ICD-10-CM

## 2022-12-14 RX ORDER — SILDENAFIL 100 MG/1
100 TABLET, FILM COATED ORAL DAILY PRN
Qty: 10 TABLET | Refills: 1 | Status: SHIPPED | OUTPATIENT
Start: 2022-12-14
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
## 2022-12-16 ENCOUNTER — APPOINTMENT (OUTPATIENT)
Dept: LAB | Facility: CLINIC | Age: 72
End: 2022-12-16

## 2022-12-16 DIAGNOSIS — E29.1 MALE HYPOGONADISM: ICD-10-CM

## 2022-12-16 LAB — TESTOST SERPL-MCNC: 230 NG/DL (ref 95–948)

## 2023-03-13 ENCOUNTER — TELEPHONE (OUTPATIENT)
Dept: OTHER | Facility: OTHER | Age: 73
End: 2023-03-13

## 2023-03-13 NOTE — TELEPHONE ENCOUNTER
If patient feels that his discomfort is secondary to his hernia surgery from greater than 30 years ago he needs to talk to his PCP or return back to the general surgeon  Especially, in the setting of no lower urinary tract symptoms

## 2023-03-13 NOTE — TELEPHONE ENCOUNTER
Call placed to patient and spoke with him  Pt informed me that 30+ years ago he had hernia surgery at [facility]  He was not sure if the pain he was experiencing can be related to that  He did follow up with [facility] in Feb  And everything checked out in regards to hernia  Pt is still experiencing left lower quadrant discomfort  Pt said pain is bearable but wanted to run his concerns by Dr Rizwana Ellison to see if there is anything he should be doing  Pt denied burning with urination, hematuria, fever, chills or flank pain at this time  Pt also denies any testicular discomfort or pain as well  I will forward message to provider to review any further recommendations at this time

## 2023-03-13 NOTE — TELEPHONE ENCOUNTER
Patient requesting to speak to Dr Mathur January, due to he has been experiencing discomfort lower left quadrant

## 2023-03-14 NOTE — TELEPHONE ENCOUNTER
Call placed to patient and spoke with him  Reviewed the CRNP recommendations with him   He is aware and thankful for the call

## 2023-03-23 DIAGNOSIS — E29.1 MALE HYPOGONADISM: ICD-10-CM

## 2023-03-23 NOTE — TELEPHONE ENCOUNTER
Patient called me directly to request a refill on COMPOUNDED Testosterone 10% Cream, 30 day supply with refills, to [facility]  The patient has an upcoming office visit scheduled for 6/14/23 with Dr Dat Michelle in the [facility] location for cystoscopy and PSA level, but will run out of medication until then        Request for same, 30 day supply with 5 refills was queued and forwarded to the Advanced Practitioner covering the [facility] location for approval

## 2023-07-12 ENCOUNTER — APPOINTMENT (OUTPATIENT)
Dept: LAB | Facility: CLINIC | Age: 73
End: 2023-07-12
Payer: MEDICARE

## 2023-07-12 DIAGNOSIS — R97.20 ELEVATED PROSTATE SPECIFIC ANTIGEN (PSA): ICD-10-CM

## 2023-07-12 LAB — PSA SERPL-MCNC: 5.09 NG/ML (ref 0–4)

## 2023-07-12 PROCEDURE — 84153 ASSAY OF PSA TOTAL: CPT

## 2023-07-12 PROCEDURE — 36415 COLL VENOUS BLD VENIPUNCTURE: CPT

## 2023-07-19 ENCOUNTER — PROCEDURE VISIT (OUTPATIENT)
Dept: UROLOGY | Facility: MEDICAL CENTER | Age: 73
End: 2023-07-19
Payer: MEDICARE

## 2023-07-19 VITALS
DIASTOLIC BLOOD PRESSURE: 82 MMHG | WEIGHT: 186 LBS | SYSTOLIC BLOOD PRESSURE: 128 MMHG | HEART RATE: 63 BPM | OXYGEN SATURATION: 95 % | BODY MASS INDEX: 27.55 KG/M2 | HEIGHT: 69 IN

## 2023-07-19 DIAGNOSIS — N30.00 ACUTE CYSTITIS WITHOUT HEMATURIA: ICD-10-CM

## 2023-07-19 DIAGNOSIS — Z85.51 HISTORY OF BLADDER CANCER: ICD-10-CM

## 2023-07-19 DIAGNOSIS — R97.20 ELEVATED PROSTATE SPECIFIC ANTIGEN (PSA): ICD-10-CM

## 2023-07-19 DIAGNOSIS — N52.8 MIXED ERECTILE DYSFUNCTION: ICD-10-CM

## 2023-07-19 DIAGNOSIS — N13.8 BPH WITH URINARY OBSTRUCTION: Primary | ICD-10-CM

## 2023-07-19 DIAGNOSIS — N40.1 BPH WITH URINARY OBSTRUCTION: Primary | ICD-10-CM

## 2023-07-19 PROBLEM — D49.4 BLADDER TUMOR: Status: RESOLVED | Noted: 2019-05-30 | Resolved: 2023-07-19

## 2023-07-19 PROBLEM — N32.89 BLADDER MASS: Status: RESOLVED | Noted: 2019-05-29 | Resolved: 2023-07-19

## 2023-07-19 LAB
SL AMB  POCT GLUCOSE, UA: NORMAL
SL AMB LEUKOCYTE ESTERASE,UA: NORMAL
SL AMB POCT BILIRUBIN,UA: NORMAL
SL AMB POCT BLOOD,UA: NORMAL
SL AMB POCT CLARITY,UA: CLEAR
SL AMB POCT COLOR,UA: YELLOW
SL AMB POCT KETONES,UA: NORMAL
SL AMB POCT NITRITE,UA: NORMAL
SL AMB POCT PH,UA: 7
SL AMB POCT SPECIFIC GRAVITY,UA: 1.01
SL AMB POCT URINE PROTEIN: NORMAL
SL AMB POCT UROBILINOGEN: 0.2

## 2023-07-19 PROCEDURE — 52000 CYSTOURETHROSCOPY: CPT | Performed by: UROLOGY

## 2023-07-19 PROCEDURE — 99213 OFFICE O/P EST LOW 20 MIN: CPT | Performed by: UROLOGY

## 2023-07-19 PROCEDURE — 81003 URINALYSIS AUTO W/O SCOPE: CPT | Performed by: UROLOGY

## 2023-07-19 RX ORDER — CEFUROXIME AXETIL 250 MG/1
250 TABLET ORAL EVERY 12 HOURS SCHEDULED
Qty: 10 TABLET | Refills: 0 | Status: SHIPPED | OUTPATIENT
Start: 2023-07-19 | End: 2023-07-24
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
## 2023-10-06 DIAGNOSIS — E29.1 MALE HYPOGONADISM: ICD-10-CM

## 2023-10-06 NOTE — TELEPHONE ENCOUNTER
Reason for call:   [x] Refill   [] Prior Auth  [] Other:     Office:   [] PCP/Provider -   [x] Speciality/Provider - Dr. Raj Murillo    Medication: Testosterone Cream        Pharmacy: [address] [place] [address]    Does the patient have enough for 3 days?    [] Yes   [x] No - Send as HP to POD

## 2023-11-08 DIAGNOSIS — E29.1 MALE HYPOGONADISM: ICD-10-CM

## 2023-11-08 NOTE — TELEPHONE ENCOUNTER
Reason for call:   [x] Refill   [] Prior Auth  [] Other:     Office:   [] PCP/Provider -   [x] Specialty/Provider -     Medication: ec- rx testosterone cream    Dose/Frequency: 10%    Pharmacy: [facility]    Does the patient have enough for 3 days?    [] Yes   [x] No - Send as HP to POD

## 2023-12-16 ENCOUNTER — OFFICE VISIT (OUTPATIENT)
Dept: URGENT CARE | Facility: CLINIC | Age: 73
End: 2023-12-16
Payer: MEDICARE

## 2023-12-16 VITALS
SYSTOLIC BLOOD PRESSURE: 130 MMHG | HEART RATE: 92 BPM | DIASTOLIC BLOOD PRESSURE: 82 MMHG | TEMPERATURE: 96 F | WEIGHT: 185 LBS | BODY MASS INDEX: 27.4 KG/M2 | HEIGHT: 69 IN | OXYGEN SATURATION: 97 % | RESPIRATION RATE: 18 BRPM

## 2023-12-16 DIAGNOSIS — H65.112 ACUTE MUCOID OTITIS MEDIA OF LEFT EAR: Primary | ICD-10-CM

## 2023-12-16 PROCEDURE — 99213 OFFICE O/P EST LOW 20 MIN: CPT

## 2023-12-16 PROCEDURE — G0463 HOSPITAL OUTPT CLINIC VISIT: HCPCS

## 2023-12-16 RX ORDER — AMOXICILLIN 875 MG/1
875 TABLET, COATED ORAL 2 TIMES DAILY
Qty: 14 TABLET | Refills: 0 | Status: SHIPPED | OUTPATIENT
Start: 2023-12-16 | End: 2023-12-23
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
## 2024-01-17 ENCOUNTER — APPOINTMENT (OUTPATIENT)
Dept: LAB | Facility: CLINIC | Age: 74
End: 2024-01-17
Payer: MEDICARE

## 2024-01-17 DIAGNOSIS — R97.20 ELEVATED PROSTATE SPECIFIC ANTIGEN (PSA): ICD-10-CM

## 2024-01-17 LAB — PSA SERPL-MCNC: 6.02 NG/ML (ref 0–4)

## 2024-01-17 PROCEDURE — 84153 ASSAY OF PSA TOTAL: CPT

## 2024-01-17 PROCEDURE — 36415 COLL VENOUS BLD VENIPUNCTURE: CPT

## 2024-01-22 DIAGNOSIS — R97.20 ELEVATED PROSTATE SPECIFIC ANTIGEN (PSA): Primary | ICD-10-CM

## 2024-02-22 ENCOUNTER — APPOINTMENT (OUTPATIENT)
Dept: LAB | Facility: CLINIC | Age: 74
End: 2024-02-22
Payer: MEDICARE

## 2024-02-22 DIAGNOSIS — R97.20 ELEVATED PROSTATE SPECIFIC ANTIGEN (PSA): ICD-10-CM

## 2024-02-22 LAB — PSA SERPL-MCNC: 6.08 NG/ML (ref 0–4)

## 2024-02-22 PROCEDURE — 84153 ASSAY OF PSA TOTAL: CPT

## 2024-02-22 PROCEDURE — 36415 COLL VENOUS BLD VENIPUNCTURE: CPT

## 2024-03-13 ENCOUNTER — HOSPITAL ENCOUNTER (OUTPATIENT)
Facility: MEDICAL CENTER | Age: 74
Discharge: HOME | End: 2024-03-13
Payer: MEDICARE

## 2024-03-13 DIAGNOSIS — R97.20 ELEVATED PROSTATE SPECIFIC ANTIGEN (PSA): ICD-10-CM

## 2024-03-13 PROCEDURE — 72197 MRI PELVIS W/O & W/DYE: CPT

## 2024-03-13 PROCEDURE — A9585 GADOBUTROL INJECTION: HCPCS

## 2024-03-13 PROCEDURE — 76377 3D RENDER W/INTRP POSTPROCES: CPT

## 2024-03-13 RX ORDER — GADOBUTROL 604.72 MG/ML
8 INJECTION INTRAVENOUS
Status: COMPLETED | OUTPATIENT
Start: 2024-03-13 | End: 2024-03-13

## 2024-03-13 RX ADMIN — GADOBUTROL 8 ML: 604.72 INJECTION INTRAVENOUS at 10:36

## 2024-03-18 ENCOUNTER — TELEPHONE (OUTPATIENT)
Dept: UROLOGY | Facility: MEDICAL CENTER | Age: 74
End: 2024-03-18

## 2024-03-20 NOTE — TELEPHONE ENCOUNTER
I spoke to the patient and scheduled his procedure for 5/9/2024 at [facility]  with Dr. Araya    -instructions given verbally and mailed  -patient aware to be NPO, needs a  and use an enema 1 hour prior to leaving the house morning of procedure  -CBC, CMP, Urine C&S and EKG 2 weeks prior  -PO/BM   
MRI prostate reviewed 3/13/24 - elevated PSA 6.08    PI-RADSv2.1 Category 4 - High -- lesions in the left anterior peripheral zone and right posterolateral peripheral zone at mid gland. No extraprostatic tumor, seminal vesicle invasion, pelvic lymphadenopathy, or pelvic osseous metastatic disease. Calculated prostate volume of 59.3 cc.    I personally called patient to review results. He is interested in proceeding with MRI fusion biopsy. Please reach out to patient to schedule biopsy. Thank you!  
Attending and PA/NP shared services statement (NON-critical care):
Attending and PA/NP shared services statement (NON-critical care):

## 2024-04-18 ENCOUNTER — OFFICE VISIT (OUTPATIENT)
Dept: UROLOGY | Facility: MEDICAL CENTER | Age: 74
End: 2024-04-18
Payer: MEDICARE

## 2024-04-18 VITALS
WEIGHT: 186 LBS | BODY MASS INDEX: 27.55 KG/M2 | SYSTOLIC BLOOD PRESSURE: 126 MMHG | OXYGEN SATURATION: 96 % | HEIGHT: 69 IN | DIASTOLIC BLOOD PRESSURE: 70 MMHG | HEART RATE: 76 BPM

## 2024-04-18 DIAGNOSIS — R97.20 ELEVATED PROSTATE SPECIFIC ANTIGEN (PSA): Primary | ICD-10-CM

## 2024-04-18 PROCEDURE — 99213 OFFICE O/P EST LOW 20 MIN: CPT | Performed by: UROLOGY

## 2024-04-18 RX ORDER — CIPROFLOXACIN 500 MG/1
500 TABLET, FILM COATED ORAL EVERY 12 HOURS SCHEDULED
COMMUNITY
Start: 2024-04-18 | End: 2024-04-23
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
## 2024-04-25 ENCOUNTER — ANESTHESIA EVENT (OUTPATIENT)
Dept: PERIOP | Facility: AMBULARY SURGERY CENTER | Age: 74
End: 2024-04-25
Payer: MEDICARE

## 2024-04-25 ENCOUNTER — APPOINTMENT (OUTPATIENT)
Dept: LAB | Facility: HOSPITAL | Age: 74
End: 2024-04-25
Payer: MEDICARE

## 2024-04-25 ENCOUNTER — OFFICE VISIT (OUTPATIENT)
Dept: LAB | Facility: HOSPITAL | Age: 74
End: 2024-04-25
Payer: MEDICARE

## 2024-04-25 DIAGNOSIS — Z01.812 PRE-OPERATIVE LABORATORY EXAMINATION: ICD-10-CM

## 2024-04-25 DIAGNOSIS — Z01.810 PRE-OPERATIVE CARDIOVASCULAR EXAMINATION: ICD-10-CM

## 2024-04-25 DIAGNOSIS — R39.89 SUSPECTED UTI: ICD-10-CM

## 2024-04-25 DIAGNOSIS — R97.20 ELEVATED PROSTATE SPECIFIC ANTIGEN (PSA): ICD-10-CM

## 2024-04-25 LAB
ALBUMIN SERPL BCP-MCNC: 4.4 G/DL (ref 3.5–5)
ALP SERPL-CCNC: 77 U/L (ref 34–104)
ALT SERPL W P-5'-P-CCNC: 49 U/L (ref 7–52)
ANION GAP SERPL CALCULATED.3IONS-SCNC: 6 MMOL/L (ref 4–13)
AST SERPL W P-5'-P-CCNC: 23 U/L (ref 13–39)
ATRIAL RATE: 68 BPM
BASOPHILS # BLD AUTO: 0.04 THOUSANDS/ÂΜL (ref 0–0.1)
BASOPHILS NFR BLD AUTO: 1 % (ref 0–1)
BILIRUB SERPL-MCNC: 0.72 MG/DL (ref 0.2–1)
BUN SERPL-MCNC: 15 MG/DL (ref 5–25)
CALCIUM SERPL-MCNC: 8.9 MG/DL (ref 8.4–10.2)
CHLORIDE SERPL-SCNC: 106 MMOL/L (ref 96–108)
CO2 SERPL-SCNC: 28 MMOL/L (ref 21–32)
CREAT SERPL-MCNC: 0.91 MG/DL (ref 0.6–1.3)
EOSINOPHIL # BLD AUTO: 0.4 THOUSAND/ÂΜL (ref 0–0.61)
EOSINOPHIL NFR BLD AUTO: 6 % (ref 0–6)
ERYTHROCYTE [DISTWIDTH] IN BLOOD BY AUTOMATED COUNT: 13.2 % (ref 11.6–15.1)
GFR SERPL CREATININE-BSD FRML MDRD: 83 ML/MIN/1.73SQ M
GLUCOSE P FAST SERPL-MCNC: 107 MG/DL (ref 65–99)
HCT VFR BLD AUTO: 45.4 % (ref 36.5–49.3)
HGB BLD-MCNC: 14.7 G/DL (ref 12–17)
IMM GRANULOCYTES # BLD AUTO: 0.01 THOUSAND/UL (ref 0–0.2)
IMM GRANULOCYTES NFR BLD AUTO: 0 % (ref 0–2)
LYMPHOCYTES # BLD AUTO: 1.64 THOUSANDS/ÂΜL (ref 0.6–4.47)
LYMPHOCYTES NFR BLD AUTO: 25 % (ref 14–44)
MCH RBC QN AUTO: 29.2 PG (ref 26.8–34.3)
MCHC RBC AUTO-ENTMCNC: 32.4 G/DL (ref 31.4–37.4)
MCV RBC AUTO: 90 FL (ref 82–98)
MONOCYTES # BLD AUTO: 0.67 THOUSAND/ÂΜL (ref 0.17–1.22)
MONOCYTES NFR BLD AUTO: 10 % (ref 4–12)
NEUTROPHILS # BLD AUTO: 3.91 THOUSANDS/ÂΜL (ref 1.85–7.62)
NEUTS SEG NFR BLD AUTO: 58 % (ref 43–75)
NRBC BLD AUTO-RTO: 0 /100 WBCS
P AXIS: 27 DEGREES
PLATELET # BLD AUTO: 269 THOUSANDS/UL (ref 149–390)
PMV BLD AUTO: 10.2 FL (ref 8.9–12.7)
POTASSIUM SERPL-SCNC: 4 MMOL/L (ref 3.5–5.3)
PR INTERVAL: 148 MS
PROT SERPL-MCNC: 6.8 G/DL (ref 6.4–8.4)
QRS AXIS: -5 DEGREES
QRSD INTERVAL: 94 MS
QT INTERVAL: 390 MS
QTC INTERVAL: 414 MS
RBC # BLD AUTO: 5.04 MILLION/UL (ref 3.88–5.62)
SODIUM SERPL-SCNC: 140 MMOL/L (ref 135–147)
T WAVE AXIS: 7 DEGREES
VENTRICULAR RATE: 68 BPM
WBC # BLD AUTO: 6.67 THOUSAND/UL (ref 4.31–10.16)

## 2024-04-25 PROCEDURE — 80053 COMPREHEN METABOLIC PANEL: CPT

## 2024-04-25 PROCEDURE — 93010 ELECTROCARDIOGRAM REPORT: CPT | Performed by: INTERNAL MEDICINE

## 2024-04-25 PROCEDURE — 87086 URINE CULTURE/COLONY COUNT: CPT

## 2024-04-25 PROCEDURE — 36415 COLL VENOUS BLD VENIPUNCTURE: CPT

## 2024-04-25 PROCEDURE — 85025 COMPLETE CBC W/AUTO DIFF WBC: CPT

## 2024-04-25 PROCEDURE — 93005 ELECTROCARDIOGRAM TRACING: CPT

## 2024-04-26 LAB — BACTERIA UR CULT: NORMAL

## 2024-05-06 NOTE — PRE-PROCEDURE INSTRUCTIONS
Pre-Surgery Instructions:   Medication Instructions    aspirin 81 MG tablet Instructions provided by MD [facility]-RX Testosterone 10 % CREA Hold day of surgery.    ezetimibe (ZETIA) 10 mg tablet Take night before surgery    fluticasone (FLONASE) 50 mcg/act nasal spray Uses PRN- OK to take day of surgery    mometasone (ELOCON) 0.1 % cream Hold day of surgery.    Multiple Vitamins-Minerals (PRESERVISION AREDS 2) CAPS Stop taking 7 days prior to surgery.    Polyvinyl Alcohol-Povidone PF (Refresh) 1.4-0.6 % SOLN Uses PRN- OK to take day of surgery    pravastatin (PRAVACHOL) 10 mg tablet Take night before surgery    psyllium (METAMUCIL) 58.6 % powder Hold day of surgery.    sildenafil (VIAGRA) 100 mg tablet Stop taking 1 day prior to surgery.   Medication instructions for day surgery reviewed. Please use only a sip of water to take your instructed medications. Avoid all over the counter vitamins, supplements and NSAIDS for one week prior to surgery per anesthesia guidelines. Tylenol is ok to take as needed.     You will receive a call one business day prior to surgery with an arrival time and hospital directions. If your surgery is scheduled on a Monday, the hospital will be calling you on the Friday prior to your surgery. If you have not heard from anyone by 8pm, please call the hospital supervisor through the hospital  at [phone]. ([place] [phone] or [place] [phone]).    Do not eat or drink anything after midnight the night before your surgery, including candy, mints, lifesavers, or chewing gum. Do not drink alcohol 24hrs before your surgery. Try not to smoke at least 24hrs before your surgery.       Follow the pre surgery showering instructions as listed in the “My Surgical Experience Booklet” or otherwise provided by your surgeon's office. Do not use a blade to shave the surgical area 1 week before surgery. It is okay to use a clean electric clippers up to 24 hours before surgery. Do not apply  any lotions, creams, including makeup, cologne, deodorant, or perfumes after showering on the day of your surgery. Do not use dry shampoo, hair spray, hair gel, or any type of hair products.     No contact lenses, eye make-up, or artificial eyelashes. Remove nail polish, including gel polish, and any artificial, gel, or acrylic nails if possible. Remove all jewelry including rings and body piercing jewelry.     Wear causal clothing that is easy to take on and off. Consider your type of surgery.    Keep any valuables, jewelry, piercings at home. Please bring any specially ordered equipment (sling, braces) if indicated.    Arrange for a responsible person to drive you to and from the hospital on the day of your surgery. Please confirm the visitor policy for the day of your procedure when you receive your phone call with an arrival time.     Call the surgeon's office with any new illnesses, exposures, or additional questions prior to surgery.    Please reference your “My Surgical Experience Booklet” for additional information to prepare for your upcoming surgery.    Pt. Verbalized an understanding of all instructions reviewed and offers no concerns at this time.

## 2024-05-09 ENCOUNTER — HOSPITAL ENCOUNTER (OUTPATIENT)
Facility: AMBULARY SURGERY CENTER | Age: 74
Setting detail: OUTPATIENT SURGERY
Discharge: HOME | End: 2024-05-09
Attending: UROLOGY | Admitting: UROLOGY
Payer: MEDICARE

## 2024-05-09 ENCOUNTER — ANESTHESIA (OUTPATIENT)
Dept: PERIOP | Facility: AMBULARY SURGERY CENTER | Age: 74
End: 2024-05-09
Payer: MEDICARE

## 2024-05-09 VITALS
OXYGEN SATURATION: 98 % | WEIGHT: 180 LBS | SYSTOLIC BLOOD PRESSURE: 148 MMHG | HEIGHT: 69 IN | TEMPERATURE: 96.2 F | DIASTOLIC BLOOD PRESSURE: 89 MMHG | HEART RATE: 65 BPM | RESPIRATION RATE: 16 BRPM | BODY MASS INDEX: 26.66 KG/M2

## 2024-05-09 DIAGNOSIS — R97.20 ELEVATED PROSTATE SPECIFIC ANTIGEN (PSA): ICD-10-CM

## 2024-05-09 PROCEDURE — 55700 PR PROSTATE NEEDLE BIOPSY ANY APPROACH: CPT | Performed by: UROLOGY

## 2024-05-09 PROCEDURE — 76942 ECHO GUIDE FOR BIOPSY: CPT | Performed by: UROLOGY

## 2024-05-09 PROCEDURE — 88344 IMHCHEM/IMCYTCHM EA MLT ANTB: CPT | Performed by: SPECIALIST

## 2024-05-09 PROCEDURE — G0416 PROSTATE BIOPSY, ANY MTHD: HCPCS | Performed by: SPECIALIST

## 2024-05-09 RX ORDER — FENTANYL CITRATE/PF 50 MCG/ML
25 SYRINGE (ML) INJECTION
Status: DISCONTINUED | OUTPATIENT
Start: 2024-05-09 | End: 2024-05-09 | Stop reason: HOSPADM

## 2024-05-09 RX ORDER — LIDOCAINE HYDROCHLORIDE 10 MG/ML
INJECTION, SOLUTION EPIDURAL; INFILTRATION; INTRACAUDAL; PERINEURAL AS NEEDED
Status: DISCONTINUED | OUTPATIENT
Start: 2024-05-09 | End: 2024-05-09

## 2024-05-09 RX ORDER — CEFAZOLIN SODIUM 2 G/50ML
2000 SOLUTION INTRAVENOUS ONCE
Status: COMPLETED | OUTPATIENT
Start: 2024-05-09 | End: 2024-05-09

## 2024-05-09 RX ORDER — ONDANSETRON 2 MG/ML
4 INJECTION INTRAMUSCULAR; INTRAVENOUS ONCE AS NEEDED
Status: DISCONTINUED | OUTPATIENT
Start: 2024-05-09 | End: 2024-05-09 | Stop reason: HOSPADM

## 2024-05-09 RX ORDER — LIDOCAINE HYDROCHLORIDE 20 MG/ML
INJECTION, SOLUTION EPIDURAL; INFILTRATION; INTRACAUDAL; PERINEURAL AS NEEDED
Status: DISCONTINUED | OUTPATIENT
Start: 2024-05-09 | End: 2024-05-09 | Stop reason: HOSPADM

## 2024-05-09 RX ORDER — PROPOFOL 10 MG/ML
INJECTION, EMULSION INTRAVENOUS AS NEEDED
Status: DISCONTINUED | OUTPATIENT
Start: 2024-05-09 | End: 2024-05-09

## 2024-05-09 RX ORDER — FENTANYL CITRATE 50 UG/ML
INJECTION, SOLUTION INTRAMUSCULAR; INTRAVENOUS AS NEEDED
Status: DISCONTINUED | OUTPATIENT
Start: 2024-05-09 | End: 2024-05-09

## 2024-05-09 RX ORDER — OXYCODONE HYDROCHLORIDE 5 MG/1
5 TABLET ORAL EVERY 4 HOURS PRN
Status: DISCONTINUED | OUTPATIENT
Start: 2024-05-09 | End: 2024-05-09 | Stop reason: HOSPADM

## 2024-05-09 RX ORDER — PROPOFOL 10 MG/ML
INJECTION, EMULSION INTRAVENOUS CONTINUOUS PRN
Status: DISCONTINUED | OUTPATIENT
Start: 2024-05-09 | End: 2024-05-09

## 2024-05-09 RX ORDER — SODIUM CHLORIDE, SODIUM LACTATE, POTASSIUM CHLORIDE, CALCIUM CHLORIDE 600; 310; 30; 20 MG/100ML; MG/100ML; MG/100ML; MG/100ML
INJECTION, SOLUTION INTRAVENOUS CONTINUOUS PRN
Status: DISCONTINUED | OUTPATIENT
Start: 2024-05-09 | End: 2024-05-09

## 2024-05-09 RX ADMIN — FENTANYL CITRATE 25 MCG: 50 INJECTION INTRAMUSCULAR; INTRAVENOUS at 09:21

## 2024-05-09 RX ADMIN — PROPOFOL 20 MG: 10 INJECTION, EMULSION INTRAVENOUS at 09:30

## 2024-05-09 RX ADMIN — PROPOFOL 100 MCG/KG/MIN: 10 INJECTION, EMULSION INTRAVENOUS at 09:21

## 2024-05-09 RX ADMIN — LIDOCAINE HYDROCHLORIDE 5 MG: 10 INJECTION, SOLUTION EPIDURAL; INFILTRATION; INTRACAUDAL; PERINEURAL at 09:21

## 2024-05-09 RX ADMIN — CEFAZOLIN SODIUM 2000 MG: 2 SOLUTION INTRAVENOUS at 09:24

## 2024-05-09 RX ADMIN — SODIUM CHLORIDE, SODIUM LACTATE, POTASSIUM CHLORIDE, AND CALCIUM CHLORIDE: .6; .31; .03; .02 INJECTION, SOLUTION INTRAVENOUS at 09:21

## 2024-05-09 RX ADMIN — FENTANYL CITRATE 50 MCG: 50 INJECTION INTRAMUSCULAR; INTRAVENOUS at 09:47

## 2024-05-09 RX ADMIN — FENTANYL CITRATE 25 MCG: 50 INJECTION INTRAMUSCULAR; INTRAVENOUS at 09:30

## 2024-05-09 RX ADMIN — PROPOFOL 50 MG: 10 INJECTION, EMULSION INTRAVENOUS at 09:21

## 2024-05-09 NOTE — INTERVAL H&P NOTE
H&P reviewed. After examining the patient I find no changes in the patients condition since the H&P had been written.    Vitals:    05/09/24 0807   BP: 147/87   Pulse: 62   Resp: 16   Temp: (!) 96.8 °F (36 °C)   SpO2: 97%     Patient is here today for MRI guided fusion biopsy of the prostate.  He has 2 PI-RADS 4 lesions that we will target as well as the remainder of the prostate.  Urine culture was negative.    Exam no  No acute distress  Regular rate and rhythm  Clear to auscultation bilaterally  Abdomen soft nontender nondistended  Lower extremity no edema    Consent was signed.  Plan for Ancef antibiotic

## 2024-05-09 NOTE — DISCHARGE INSTR - AVS FIRST PAGE
Mr. Solo Garland,      Your perineal biopsy procedure went well.  We were able to obtain the samples in the areas we try to target and have sent these to Pathology for their review.  The results should be back in 5-7 business days.  There should be an appointment set up to discuss the results in person.    Some blood in the urine is normal for approximately 1 week after surgery.  It can last in the ejaculate for up to 1 month.    Most patients do not need prescription medications after this procedure (including no antibiotics or narcotic pain medications).  Please try taking Tylenol and Motrin over-the-counter to help with discomfort.  If you are having significant pain issues please contact me.    Please call us immediately if you are having fevers or chills or feel like you have a infection.    Some patients can have difficulty with voiding after a biopsy because of swelling of the prostate which can block the urethra.  This usually improves with time but if you are having difficulty voiding please let us now as we may need to temporarily insert a catheter.      You have any questions or concerns please do not hesitate to call us, [phone].    Best,  Prudencio Araya MD      Prostate Biopsy   WHAT YOU NEED TO KNOW:   A prostate biopsy is a procedure to remove samples of tissue from your prostate gland. The prostate is a male sex gland that makes fluid found in semen. It is located just below the bladder. After the samples are removed, they are sent to a lab and tested for cancer.       DISCHARGE INSTRUCTIONS:   Seek care immediately if:   You have heavy bleeding from your urethra.    You urinate very little or not at all.    You have pain from your procedure that gets worse, even after you take pain medicine.    Call your urologist or doctor if:   You have a fever or chills.    You feel pain or burning when you urinate.    Your urine is cloudy or smells bad.    You have questions or concerns about your condition  or care.    Medicines:  You may need any of the following:  Antibiotics  help prevent or treat a bacterial infection.    Acetaminophen  decreases pain and fever. It is available without a doctor's order. Ask how much to take and how often to take it. Follow directions. Read the labels of all other medicines you are using to see if they also contain acetaminophen, or ask your doctor or pharmacist. Acetaminophen can cause liver damage if not taken correctly. Do not use more than 4 grams (4,000 milligrams) total of acetaminophen in one day.     Prescription pain medicine  may be given. Ask your healthcare provider how to take this medicine safely. Some prescription pain medicines contain acetaminophen. Do not take other medicines that contain acetaminophen without talking to your healthcare provider. Too much acetaminophen may cause liver damage. Prescription pain medicine may cause constipation. Ask your healthcare provider how to prevent or treat constipation.     Take your medicine as directed.  Contact your healthcare provider if you think your medicine is not helping or if you have side effects. Tell him or her if you are allergic to any medicine. Keep a list of the medicines, vitamins, and herbs you take. Include the amounts, and when and why you take them. Bring the list or the pill bottles to follow-up visits. Carry your medicine list with you in case of an emergency.    Follow up with your urologist or doctor as directed:  You may need to return for more tests or procedures. Write down your questions so you remember to ask them during your visits.  © Copyright [employer] 2021 Information is for End User's use only and may not be sold, redistributed or otherwise used for commercial purposes. All illustrations and images included in CareNotes® are the copyrighted property of [employer] Inc[employer] or [employer]  The above information is an  only. It is not intended as medical advice for  individual conditions or treatments. Talk to your doctor, nurse or pharmacist before following any medical regimen to see if it is safe and effective for you.

## 2024-05-09 NOTE — OP NOTE
OPERATIVE REPORT  PATIENT NAME: Solo Garland    :  1950  MRN: [id number]  Pt Location: [facility] 04    SURGERY DATE: 2024    Surgeons and Role:     * Prudencio Araya MD - Primary    Preop Diagnosis:  Elevated prostate specific antigen (PSA) [R97.20]    Post-Op Diagnosis Codes:     * Elevated prostate specific antigen (PSA) [R97.20]    Procedure(s):  TRANSPERINEAL MRI FUSION BIOPSY PROSTATE    Specimen(s):  ID Type Source Tests Collected by Time Destination   1 : RIGHT ANTERIOR MEDIAL Tissue Prostate TISSUE EXAM Prudencio Araya MD 2024 0845    2 : RIGHT ANTERIOR LATERAL Tissue Prostate TISSUE EXAM Prudencio Araya MD 2024 0845    3 : RIGHT POSTERIOR LATERAL Tissue Prostate TISSUE EXAM Prudencio Araya MD 2024 0845    4 : LEFT ANTERIOR MEDIAL Tissue Prostate TISSUE EXAM Prudencio Araya MD 2024 0845    5 : LEFT ANTERIOR LATERAL Tissue Prostate TISSUE EXAM Prudencio Araya MD 2024 0845    6 : LEFT POSTERIOR LATERAL Tissue Prostate TISSUE EXAM Prudencio Araya MD 2024 0845    7 : LEFT POSTERIOR MEDIAL Tissue Prostate TISSUE EXAM Prudencio Araya MD 2024 0845    8 : LEFT BASE Tissue Prostate TISSUE EXAM Prudencio Araya MD 2024 0845    9 : RIGHT POSTERIOR MEDIAL Tissue Prostate TISSUE EXAM Prudencio Araya MD 2024 0845    10 : RIGHT BASE Tissue Prostate TISSUE EXAM Prudencio Araya MD 2024 0845    11 : LISA: LEFT MID GLAND ANTERIOR Tissue Prostate TISSUE EXAM Prudencio Araya MD 2024 0847    12 : LISA: RIGHT MID GLAND POSTERIOLATERAL Tissue Prostate TISSUE EXAM Prudencio Araya MD 2024 0848        Estimated Blood Loss:   Minimal    Drains:  * No LDAs found *    Anesthesia Type:   IV Sedation with Anesthesia    Operative Indications:  Patient with elevated PSA and MRI showing 2 PI-RADS 4 lesions    Operative Findings:  Each region of interest was biopsied several times.    Complications:   None    Procedure and Technique:    Procedure was transperineal biopsy utilizing the  Precision Point perineal access device    Solo Garland is a 73 y.o. male.     Procedure was transperineal saturation prostate biopsy with MRI fusion sampling of lesion or lesions utilizing the Precision Point perineal access device utilized to map the standard geographic sites of the prostate.    He was pretreated with Ancef antibiotic.  He was met in the prep and hold area and the procedure along with its risks and benefits were discussed and reviewed.  Patient signed an informed consent.    Transferred to OR. He was placed in dorsal lithotomy with care to pad all pressure points.  His perineum and genitalia were shave/prepped with a single blade razor.  The scrotum was elevated a secured aware from the perineum above the rectum with sterile ioban.   His perineum and genitalia were prepped with a ChloraPrep solution.        With the assistance of the UroNav technician, a survey of the prostate anatomy was performed. The technician then linked the previously obtained MRI images to the real-time ultrasound. The PIRADs lesions seen on MRI were identified on the ultrasound.     Ultrasound was performed to map patient's prostate real time to Uronav with elastic deformation.    In the midportion of the left and right prostate, a caro was denoted in the perineal skin.  An associated skin wheal was created with 5 cc of 1% lidocaine plain in both of these mid lobe locations on either side.     The PrecisionPoint device was then introduced into the left perineal subcutaneous tissue.  We visualized the tip of the needle.  Needle was introduced through the subcutaneous fat and into the pelvic floor muscle where a perineal pudendal block was performed with additional 5 cc of 1% lidocaine.  This was repeated on the patient's right side.    Utilizing the Precision Point access device, the perineum was access via finder needle.     The Uronav machine was utilized to place the needle into the concerning area located along the  left anterior mid gland on MRI with 5 biopsies taken.  The software provided confirmation that biopsies were within the region of interest.    Attention was then turned towards the second region of interest which is located along the right posterior apical aspect of the where 4 biopsies were taken    We then performed our standard prostate biopsy template.      RIGHT posterior medial: 3 biopsies taken  RIGHT posterior lateral: 4 biopsies taken  RIGHT base: 2 biopsies taken  RIGHT anterior medial: 2 biopsies taken  RIGHT anterior lateral: 3 biopsies taken    The Precision Point access needle and stepper was re-positioned into the LEFT perineal space and ultrasound guidance was utilized to place the spring-loaded biopsy needle into the following areas    LEFT posterior medial: 3 biopsies taken  LEFT posterior lateral: 3 biopsies taken  LEFT base: 2 biopsies taken  LEFT anterior medial: 3 biopsies taken  LEFT anterior lateral: 3 biopsies taken    A total 37  biopsies were taken in total.    The transplant rectal ultrasound probe was removed. The Ioban was removed from the skin.  Direct pressure was held for 2 minutes for hemostasis.     At the completion of the procedure, the patient was taken out of dorsal lithotomy, extubated and transferred to PACU in good condition.     Overall the patient tolerated the procedure and there were no complications.    Plan-the patient will return for biopsy pathology review in 2 weeks.     A qualified resident physician was not available.    Patient Disposition:  PACU         SIGNATURE: Prudencio Araya MD  DATE: May 9, 2024  TIME: 9:47 AM

## 2024-05-09 NOTE — ANESTHESIA POSTPROCEDURE EVALUATION
Post-Op Assessment Note    CV Status:  Stable  Pain Score: 0    Pain management: adequate       Mental Status:  Alert and awake   Hydration Status:  Euvolemic   PONV Controlled:  Controlled   Airway Patency:  Patent  Two or more mitigation strategies used for obstructive sleep apnea. There is a medical reason for not screening for obstructive sleep apnea and/or for not using two or more mitigation strategies   Post Op Vitals Reviewed: Yes    No anethesia notable event occurred.    Staff: Anesthesiologist, CRNA               BP   118/65   Temp   97.1   Pulse  66   Resp   16   SpO2   100

## 2024-05-09 NOTE — ANESTHESIA PREPROCEDURE EVALUATION
Procedure:  TRANSPERINEAL MRI FUSION BIOPSY PROSTATE (Perineum)    Relevant Problems   CARDIO   (+) Hyperlipidemia      /RENAL   (+) BPH with urinary obstruction   (+) Benign localized hyperplasia of prostate with urinary obstruction and lower urinary tract symptoms        Physical Exam    Airway    Mallampati score: II  TM Distance: >3 FB  Neck ROM: full     Dental   No notable dental hx     Cardiovascular  Rhythm: regular, Rate: normal, No weak pulses    Pulmonary   No stridor    Other Findings        Anesthesia Plan  ASA Score- 2     Anesthesia Type- IV sedation with anesthesia with ASA Monitors.         Additional Monitors:     Airway Plan:            Plan Factors-    Chart reviewed.   Existing labs reviewed. Patient summary reviewed.          Obstructive sleep apnea risk education given perioperatively.        Induction- intravenous.    Postoperative Plan- Plan for postoperative opioid use.     Informed Consent- Anesthetic plan and risks discussed with patient.  I personally reviewed this patient with the CRNA. Discussed and agreed on the Anesthesia Plan with the CRNA..

## 2024-05-13 PROCEDURE — 88344 IMHCHEM/IMCYTCHM EA MLT ANTB: CPT | Performed by: SPECIALIST

## 2024-05-13 PROCEDURE — G0416 PROSTATE BIOPSY, ANY MTHD: HCPCS | Performed by: SPECIALIST

## 2024-05-20 ENCOUNTER — OFFICE VISIT (OUTPATIENT)
Dept: UROLOGY | Facility: MEDICAL CENTER | Age: 74
End: 2024-05-20
Payer: MEDICARE

## 2024-05-20 ENCOUNTER — TELEPHONE (OUTPATIENT)
Age: 74
End: 2024-05-20

## 2024-05-20 VITALS
WEIGHT: 183 LBS | RESPIRATION RATE: 21 BRPM | BODY MASS INDEX: 27.11 KG/M2 | HEART RATE: 70 BPM | SYSTOLIC BLOOD PRESSURE: 118 MMHG | DIASTOLIC BLOOD PRESSURE: 80 MMHG | OXYGEN SATURATION: 99 % | HEIGHT: 69 IN

## 2024-05-20 DIAGNOSIS — E29.1 HYPOGONADISM IN MALE: ICD-10-CM

## 2024-05-20 DIAGNOSIS — C61 PROSTATE CANCER (HCC): Primary | ICD-10-CM

## 2024-05-20 PROCEDURE — 99214 OFFICE O/P EST MOD 30 MIN: CPT | Performed by: UROLOGY

## 2024-05-20 RX ORDER — PRAVASTATIN SODIUM 40 MG
TABLET ORAL
COMMUNITY
Start: 2024-04-18

## 2024-05-20 RX ORDER — DIPHENOXYLATE HYDROCHLORIDE AND ATROPINE SULFATE 2.5; .025 MG/1; MG/1
1 TABLET ORAL DAILY
COMMUNITY

## 2024-05-20 RX ORDER — OMEGA-3S/DHA/EPA/FISH OIL/D3 300MG-1000
400 CAPSULE ORAL DAILY
COMMUNITY

## 2024-05-20 NOTE — TELEPHONE ENCOUNTER
"Jacek Olivares called today to request   office visit note, pathology reports from 5/9 and the one in 2019 for \"the bladder mass that was taken off\", MRI Prostate imaging report,   and any labs/PSA results.    Patient is scheduled to be seen by them on 5/22.     Fax number [phone]      "
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
## 2024-05-20 NOTE — PATIENT INSTRUCTIONS
Ask Jacek Olivares opinion about the treatment of testosterone as we find you have low-grade prostate cancer.    Ask them if they need the actual films of the MRI, on a disc.

## 2024-07-10 ENCOUNTER — LAB REQUISITION (OUTPATIENT)
Dept: LAB | Facility: HOSPITAL | Age: 74
End: 2024-07-10

## 2024-07-10 DIAGNOSIS — R97.20 ELEVATED PROSTATE SPECIFIC ANTIGEN (PSA): ICD-10-CM

## 2024-07-10 LAB — SCAN RESULT: NORMAL

## 2024-07-19 ENCOUNTER — TELEPHONE (OUTPATIENT)
Dept: UROLOGY | Facility: CLINIC | Age: 74
End: 2024-07-19

## 2024-07-19 NOTE — TELEPHONE ENCOUNTER
I called the patient to discuss his biopsy results but it seems he is already discussed with Dr. Mtz and getting a second opinion at [facility].

## (undated) DEVICE — INVIEW CLEAR LEGGINGS: Brand: CONVERTORS

## (undated) DEVICE — PACK TUR

## (undated) DEVICE — BAG DECANTER

## (undated) DEVICE — GLOVE SRG BIOGEL 7.5

## (undated) DEVICE — NEEDLE 18 G X 1 1/2

## (undated) DEVICE — SPECIMEN CONTAINER STERILE PEEL PACK

## (undated) DEVICE — 3M™ STERI-STRIP™ COMPOUND BENZOIN TINCTURE 40 BAGS/CARTON 4 CARTONS/CASE C1544: Brand: 3M™ STERI-STRIP™

## (undated) DEVICE — UROCATCH BAG

## (undated) DEVICE — SCD SEQUENTIAL COMPRESSION COMFORT SLEEVE MEDIUM KNEE LENGTH: Brand: KENDALL SCD

## (undated) DEVICE — CATH FOLEY 22FR 5ML 2 WAY SILICONE ELASTIMER

## (undated) DEVICE — CHLORAPREP HI-LITE 26ML ORANGE

## (undated) DEVICE — GLOVE INDICATOR PI UNDERGLOVE SZ 7.5 BLUE

## (undated) DEVICE — LUBRICANT SURGILUBE TUBE 4 OZ  FLIP TOP

## (undated) DEVICE — DRAPE EQUIPMENT RF WAND

## (undated) DEVICE — GLOVE SRG BIOGEL 7

## (undated) DEVICE — BAG URINE DRAINAGE 2000ML ANTI RFLX LF

## (undated) DEVICE — 3M™ IOBAN™ 2 ANTIMICROBIAL INCISE DRAPE 6640EZ: Brand: IOBAN™ 2

## (undated) DEVICE — GAUZE SPONGES,16 PLY: Brand: CURITY

## (undated) DEVICE — RAZOR DBLE EDGE SHAVER

## (undated) DEVICE — TUBING SUCTION 5MM X 12 FT

## (undated) DEVICE — GLOVE SRG BIOGEL ECLIPSE 7.5

## (undated) DEVICE — PREP PAD BNS: Brand: CONVERTORS

## (undated) DEVICE — GUARDIAN LVC: Brand: GUARDIAN

## (undated) DEVICE — CATH FOLEY 20FR 5ML 2 WAY SILICONE ELASTIMER

## (undated) DEVICE — CHEMOTHERAPY CONTAINER,HINGED LID, YELLOW: Brand: SHARPSAFETY

## (undated) DEVICE — SYRINGE 10ML LL

## (undated) DEVICE — TRANSPOSAL ULTRAFLEX DUO/QUAD ULTRA CART MANIFOLD

## (undated) DEVICE — NEEDLE 25G X 1 1/2

## (undated) DEVICE — HEAVY DUTY TABLE COVER: Brand: CONVERTORS

## (undated) DEVICE — MAX-CORE® DISPOSABLE CORE BIOPSY INSTRUMENT, 18G X 25CM: Brand: MAX-CORE

## (undated) DEVICE — ULTRASOUND GEL STERILE FOIL PK

## (undated) DEVICE — Device: Brand: OLYMPUS

## (undated) DEVICE — SYSTEM TRANSPERINEAL ACCESS PRECISIONPOINT

## (undated) DEVICE — UTILITY MARKER,BLACK WITH LABELS: Brand: DEVON

## (undated) DEVICE — TELFA NON-ADHERENT ABSORBENT DRESSING: Brand: TELFA

## (undated) DEVICE — BASIC SINGLE BASIN-LF: Brand: MEDLINE INDUSTRIES, INC.